# Patient Record
Sex: MALE | Race: BLACK OR AFRICAN AMERICAN | NOT HISPANIC OR LATINO | URBAN - METROPOLITAN AREA
[De-identification: names, ages, dates, MRNs, and addresses within clinical notes are randomized per-mention and may not be internally consistent; named-entity substitution may affect disease eponyms.]

---

## 2022-08-17 ENCOUNTER — TELEPHONE (OUTPATIENT)
Dept: TRANSPLANT | Facility: CLINIC | Age: 27
End: 2022-08-17
Payer: MEDICARE

## 2022-08-26 DIAGNOSIS — Z76.82 ORGAN TRANSPLANT CANDIDATE: Primary | ICD-10-CM

## 2022-08-29 ENCOUNTER — TELEPHONE (OUTPATIENT)
Dept: TRANSPLANT | Facility: CLINIC | Age: 27
End: 2022-08-29
Payer: MEDICARE

## 2022-08-29 NOTE — TELEPHONE ENCOUNTER
Contacted patient to review initial intake information. Patient reports the followin. Can you walk up a flight of stairs without getting short of breath or stopping? Yes   2. Can you walk one block without getting short of breath or having to stop? Yes   3. Do you use oxygen? No   4. Do you use a cane, walker, or wheel chair to assist in mobility? No   5. Have you been hospitalized or had recent surgery in the last 6 months? No    A. Stroke   B. Heart surgery or heart catheterization   C. Broken bone  6. Do you have any cuts, open sores (ulcers), or wounds anywhere on your body? Yes on his fistula  7. Do you go to dialysis? Yes What days? Daily  8. Preferred appointment day? Anyday  9. Caregiver? Not sure yet    Patient is aware the next steps will include completing records and compliance verification. Patient is aware once provider review and insurance authorization is received we will contact patient to schedule initial visit. Patient is aware that initial visit will begin prior to / at 7 am and will conclude at approximately 3 pm on date of appointment. All questions answered at this time.

## 2022-09-12 ENCOUNTER — TELEPHONE (OUTPATIENT)
Dept: TRANSPLANT | Facility: CLINIC | Age: 27
End: 2022-09-12
Payer: MEDICARE

## 2022-09-13 ENCOUNTER — TELEPHONE (OUTPATIENT)
Dept: TRANSPLANT | Facility: CLINIC | Age: 27
End: 2022-09-13
Payer: MEDICARE

## 2022-09-26 ENCOUNTER — TELEPHONE (OUTPATIENT)
Dept: TRANSPLANT | Facility: CLINIC | Age: 27
End: 2022-09-26
Payer: MEDICARE

## 2023-08-18 ENCOUNTER — TELEPHONE (OUTPATIENT)
Dept: TRANSPLANT | Facility: CLINIC | Age: 28
End: 2023-08-18
Payer: COMMERCIAL

## 2023-08-24 ENCOUNTER — TELEPHONE (OUTPATIENT)
Dept: TRANSPLANT | Facility: CLINIC | Age: 28
End: 2023-08-24
Payer: COMMERCIAL

## 2023-08-24 NOTE — TELEPHONE ENCOUNTER
Contacted patient to review initial intake information. Patient reports the followin. Can you walk up a flight of stairs without getting short of breath or stopping? yes  2. Can you walk one block without getting short of breath or having to stop? yes  3. Do you use oxygen? no  4. Do you use a cane, walker, or wheel chair to assist in mobility? no  5. Have you been hospitalized or had recent surgery in the last 6 months? no   A. Stroke    B. Heart surgery or heart catheterization   C. Broken bone  6. Do you have any cuts, open sores (ulcers), or wounds anywhere on your body?  No I  7. Do you go to dialysis? Yes What days? M,T,T,F,S  8. Preferred appointment day?   9. Caregiver? Brother (Elpidio) and Mother (Tony)    Patient is aware the next steps will include completing records and compliance verification. Patient is aware once provider review and insurance authorization is received we will contact patient to schedule initial visit. Patient is aware that initial visit will begin prior to / at 7 am and will conclude at approximately 3 pm on date of appointment. All questions answered at this time.

## 2023-08-31 ENCOUNTER — TELEPHONE (OUTPATIENT)
Dept: TRANSPLANT | Facility: CLINIC | Age: 28
End: 2023-08-31
Payer: COMMERCIAL

## 2023-09-01 ENCOUNTER — TELEPHONE (OUTPATIENT)
Dept: TRANSPLANT | Facility: CLINIC | Age: 28
End: 2023-09-01
Payer: MEDICARE

## 2023-09-06 DIAGNOSIS — Z76.82 ORGAN TRANSPLANT CANDIDATE: Primary | ICD-10-CM

## 2023-10-02 ENCOUNTER — TELEPHONE (OUTPATIENT)
Dept: TRANSPLANT | Facility: CLINIC | Age: 28
End: 2023-10-02
Payer: MEDICARE

## 2023-10-11 ENCOUNTER — HOSPITAL ENCOUNTER (OUTPATIENT)
Dept: RADIOLOGY | Facility: HOSPITAL | Age: 28
Discharge: HOME | End: 2023-10-11
Payer: MEDICARE

## 2023-10-11 ENCOUNTER — TELEPHONE (OUTPATIENT)
Dept: TRANSPLANT | Facility: CLINIC | Age: 28
End: 2023-10-11
Payer: MEDICARE

## 2023-10-11 ENCOUNTER — OFFICE VISIT (OUTPATIENT)
Dept: TRANSPLANT | Facility: CLINIC | Age: 28
End: 2023-10-11
Payer: COMMERCIAL

## 2023-10-11 ENCOUNTER — HOSPITAL ENCOUNTER (OUTPATIENT)
Dept: RADIOLOGY | Facility: HOSPITAL | Age: 28
Discharge: HOME | End: 2023-10-11
Attending: NURSE PRACTITIONER
Payer: MEDICARE

## 2023-10-11 VITALS
OXYGEN SATURATION: 99 % | BODY MASS INDEX: 38.04 KG/M2 | WEIGHT: 256.81 LBS | HEART RATE: 114 BPM | DIASTOLIC BLOOD PRESSURE: 74 MMHG | RESPIRATION RATE: 16 BRPM | SYSTOLIC BLOOD PRESSURE: 132 MMHG | TEMPERATURE: 97 F | HEIGHT: 69 IN

## 2023-10-11 DIAGNOSIS — N18.6 ESRD ON HEMODIALYSIS: ICD-10-CM

## 2023-10-11 DIAGNOSIS — Z71.85 IMMUNIZATION COUNSELING: ICD-10-CM

## 2023-10-11 DIAGNOSIS — N04.4: ICD-10-CM

## 2023-10-11 DIAGNOSIS — Z99.2 ANEMIA IN CHRONIC KIDNEY DISEASE, ON CHRONIC DIALYSIS: ICD-10-CM

## 2023-10-11 DIAGNOSIS — N25.81 SECONDARY HYPERPARATHYROIDISM OF RENAL ORIGIN: ICD-10-CM

## 2023-10-11 DIAGNOSIS — Z76.82 ORGAN TRANSPLANT CANDIDATE: ICD-10-CM

## 2023-10-11 DIAGNOSIS — D63.1 ANEMIA IN CHRONIC KIDNEY DISEASE, ON CHRONIC DIALYSIS: ICD-10-CM

## 2023-10-11 DIAGNOSIS — N18.6 ANEMIA IN CHRONIC KIDNEY DISEASE, ON CHRONIC DIALYSIS: ICD-10-CM

## 2023-10-11 DIAGNOSIS — I95.3 HEMODIALYSIS-ASSOCIATED HYPOTENSION: ICD-10-CM

## 2023-10-11 DIAGNOSIS — Z01.818 PRE-TRANSPLANT EVALUATION FOR CHRONIC KIDNEY DISEASE: Primary | ICD-10-CM

## 2023-10-11 DIAGNOSIS — B17.10 ACUTE HEPATITIS C VIRUS INFECTION WITHOUT HEPATIC COMA: ICD-10-CM

## 2023-10-11 DIAGNOSIS — E66.01 CLASS 2 SEVERE OBESITY DUE TO EXCESS CALORIES WITH SERIOUS COMORBIDITY AND BODY MASS INDEX (BMI) OF 38.0 TO 38.9 IN ADULT: ICD-10-CM

## 2023-10-11 DIAGNOSIS — R21 RASH: ICD-10-CM

## 2023-10-11 DIAGNOSIS — Z99.2 ESRD ON HEMODIALYSIS: ICD-10-CM

## 2023-10-11 DIAGNOSIS — E78.5 HYPERLIPIDEMIA, UNSPECIFIED HYPERLIPIDEMIA TYPE: ICD-10-CM

## 2023-10-11 PROBLEM — E66.812 CLASS 2 SEVERE OBESITY DUE TO EXCESS CALORIES WITH SERIOUS COMORBIDITY AND BODY MASS INDEX (BMI) OF 38.0 TO 38.9 IN ADULT: Status: ACTIVE | Noted: 2023-10-11

## 2023-10-11 PROBLEM — B19.20 UNSPECIFIED VIRAL HEPATITIS C WITHOUT HEPATIC COMA: Status: ACTIVE | Noted: 2022-05-31

## 2023-10-11 PROBLEM — N18.9 ANEMIA IN CHRONIC KIDNEY DISEASE: Status: ACTIVE | Noted: 2022-03-06

## 2023-10-11 PROCEDURE — 1160F RVW MEDS BY RX/DR IN RCRD: CPT | Mod: CPTII,S$GLB,TXP, | Performed by: NURSE PRACTITIONER

## 2023-10-11 PROCEDURE — 72170 X-RAY EXAM OF PELVIS: CPT | Mod: TC,TXP

## 2023-10-11 PROCEDURE — 99999 PR PBB SHADOW E&M-EST. PATIENT-LVL IV: CPT | Mod: PBBFAC,TXP,, | Performed by: NURSE PRACTITIONER

## 2023-10-11 PROCEDURE — 99205 OFFICE O/P NEW HI 60 MIN: CPT | Mod: S$GLB,TXP,, | Performed by: NURSE PRACTITIONER

## 2023-10-11 PROCEDURE — 3078F DIAST BP <80 MM HG: CPT | Mod: CPTII,S$GLB,TXP, | Performed by: NURSE PRACTITIONER

## 2023-10-11 PROCEDURE — 71046 X-RAY EXAM CHEST 2 VIEWS: CPT | Mod: TC,TXP

## 2023-10-11 PROCEDURE — 72170 X-RAY EXAM OF PELVIS: CPT | Mod: 26,TXP,, | Performed by: RADIOLOGY

## 2023-10-11 PROCEDURE — 76770 US EXAM ABDO BACK WALL COMP: CPT | Mod: TC,TXP

## 2023-10-11 PROCEDURE — 99999 PR PBB SHADOW E&M-EST. PATIENT-LVL IV: ICD-10-PCS | Mod: PBBFAC,TXP,, | Performed by: NURSE PRACTITIONER

## 2023-10-11 PROCEDURE — 99205 PR OFFICE/OUTPT VISIT, NEW, LEVL V, 60-74 MIN: ICD-10-PCS | Mod: S$GLB,TXP,, | Performed by: NURSE PRACTITIONER

## 2023-10-11 PROCEDURE — 3075F PR MOST RECENT SYSTOLIC BLOOD PRESS GE 130-139MM HG: ICD-10-PCS | Mod: CPTII,S$GLB,TXP, | Performed by: NURSE PRACTITIONER

## 2023-10-11 PROCEDURE — 1159F MED LIST DOCD IN RCRD: CPT | Mod: CPTII,S$GLB,TXP, | Performed by: NURSE PRACTITIONER

## 2023-10-11 PROCEDURE — 3008F BODY MASS INDEX DOCD: CPT | Mod: CPTII,S$GLB,TXP, | Performed by: NURSE PRACTITIONER

## 2023-10-11 PROCEDURE — 99203 OFFICE O/P NEW LOW 30 MIN: CPT | Mod: S$GLB,TXP,, | Performed by: STUDENT IN AN ORGANIZED HEALTH CARE EDUCATION/TRAINING PROGRAM

## 2023-10-11 PROCEDURE — 3075F SYST BP GE 130 - 139MM HG: CPT | Mod: CPTII,S$GLB,TXP, | Performed by: NURSE PRACTITIONER

## 2023-10-11 PROCEDURE — 1159F PR MEDICATION LIST DOCUMENTED IN MEDICAL RECORD: ICD-10-PCS | Mod: CPTII,S$GLB,TXP, | Performed by: NURSE PRACTITIONER

## 2023-10-11 PROCEDURE — 99203 PR OFFICE/OUTPT VISIT, NEW, LEVL III, 30-44 MIN: ICD-10-PCS | Mod: S$GLB,TXP,, | Performed by: STUDENT IN AN ORGANIZED HEALTH CARE EDUCATION/TRAINING PROGRAM

## 2023-10-11 PROCEDURE — 72170 XR PELVIS ROUTINE AP: ICD-10-PCS | Mod: 26,TXP,, | Performed by: RADIOLOGY

## 2023-10-11 PROCEDURE — 3078F PR MOST RECENT DIASTOLIC BLOOD PRESSURE < 80 MM HG: ICD-10-PCS | Mod: CPTII,S$GLB,TXP, | Performed by: NURSE PRACTITIONER

## 2023-10-11 PROCEDURE — 71046 XR CHEST PA AND LATERAL: ICD-10-PCS | Mod: 26,TXP,, | Performed by: RADIOLOGY

## 2023-10-11 PROCEDURE — 76770 US RETROPERITONEAL COMPLETE: ICD-10-PCS | Mod: 26,TXP,, | Performed by: INTERNAL MEDICINE

## 2023-10-11 PROCEDURE — 71046 X-RAY EXAM CHEST 2 VIEWS: CPT | Mod: 26,TXP,, | Performed by: RADIOLOGY

## 2023-10-11 PROCEDURE — 3008F PR BODY MASS INDEX (BMI) DOCUMENTED: ICD-10-PCS | Mod: CPTII,S$GLB,TXP, | Performed by: NURSE PRACTITIONER

## 2023-10-11 PROCEDURE — 76770 US EXAM ABDO BACK WALL COMP: CPT | Mod: 26,TXP,, | Performed by: INTERNAL MEDICINE

## 2023-10-11 PROCEDURE — 1160F PR REVIEW ALL MEDS BY PRESCRIBER/CLIN PHARMACIST DOCUMENTED: ICD-10-PCS | Mod: CPTII,S$GLB,TXP, | Performed by: NURSE PRACTITIONER

## 2023-10-11 RX ORDER — CALCITRIOL 0.5 UG/1
0.5 CAPSULE ORAL DAILY
COMMUNITY
Start: 2023-08-15

## 2023-10-11 RX ORDER — CINACALCET 90 MG/1
1 TABLET, FILM COATED ORAL DAILY
COMMUNITY
Start: 2023-08-09

## 2023-10-11 RX ORDER — SUCROFERRIC OXYHYDROXIDE 500 MG/1
2 TABLET, CHEWABLE ORAL
COMMUNITY
Start: 2023-07-12

## 2023-10-11 RX ORDER — FOLIC ACID/VIT B COMPLEX AND C 0.8 MG
1 TABLET ORAL DAILY
COMMUNITY
Start: 2023-07-05

## 2023-10-11 NOTE — PROGRESS NOTES
PHARM.D. PRE-TRANSPLANT NOTE:    This patient's medication therapy was evaluated as part of his pre-transplant evaluation.      The following general pharmacologic concerns were noted: none     The following concerns for post-operative pain management were noted: none    The following pharmacologic concerns related to HCV therapy were noted: none       This patient's medication profile was reviewed for considerations for DAA Hepatitis C therapy:    [X]  No current inducers of CYP 3A4 or PGP  [X]  No amiodarone on this patient's EMR profile in the last 24 months  [X]  No past or current atrial fibrillation on this patient's EMR profile       Current Outpatient Medications   Medication Sig Dispense Refill    B complex-vitamin C-folic acid (XENIA-RC) 0.8 mg Tab Take 1 tablet by mouth once daily.      calcitRIOL (ROCALTROL) 0.5 MCG Cap Take 0.5 mcg by mouth once daily.      cinacalcet (SENSIPAR) 90 MG Tab Take 1 tablet by mouth once daily.      sucroferric oxyhydroxide (VELPHORO) 500 mg Chew Take 2 tablets by mouth 3 (three) times daily with meals.       No current facility-administered medications for this visit.           I am available for consultation and can be contacted, as needed by the other members of the Transplant team.

## 2023-10-11 NOTE — LETTER
October 13, 2023        Ganesh Arambula  [address] [facility] [address]  Phone: [phone]  Fax: [phone]             [facility] 1st Fl  [address]  [facility] [address]  Phone: [phone]   Patient: Cholo Jackson   MR Number: [id number]   YOB: 1995   Date of Visit: 10/11/2023       Dear Dr. Ganesh Arambula    Thank you for referring Cholo Jackson to me for evaluation. Attached you will find relevant portions of my assessment and plan of care.    If you have questions, please do not hesitate to call me. I look forward to following Cholo Jackson along with you.    Sincerely,    Dariana Rosas, NP    Enclosure    If you would like to receive this communication electronically, please contact [email or web address] or [phone] to request [employer] access.    [employer] is a tool which provides read-only access to select patient information with whom you have a relationship. Its easy to use and provides real time access to review your patients record including encounter summaries, notes, results, and demographic information.    If you feel you have received this communication in error or would no longer like to receive these types of communications, please e-mail [email or web address]

## 2023-10-11 NOTE — PROGRESS NOTES
Transplant Surgery  Kidney Transplant Recipient Evaluation    Referring Physician: Ganesh Arambula  Current Nephrologist: Ganesh Arambula    Subjective:     Reason for Visit: evaluate transplant candidacy    History of Present Illness: Cholo Jackson is a 27 y.o. year old male undergoing transplant evaluation.    Dialysis History: Cholo is on hemodialysis.      Transplant History: N/A    Etiology of Renal Disease: Membranous Glomerulonephritis (based on medical records from referral).    External provider notes reviewed: Yes    Review of Systems  Objective:     Physical Exam:  Constitutional:   Vitals reviewed: yes   Well-nourished and well-groomed: yes  Eyes:   Sclerae icteric: no   Extraocular movements intact: yes  GI:    Bowel sounds normal: yes   Tenderness: no    If yes, quadrant/location: not applicable   Palpable masses: no    If yes, quadrant/location: not applicable   Hepatosplenomegaly: no   Ascites: no   Hernia: no    If yes, type/location: not applicable   Surgical scars: no    If yes, type/location: not applicable  Resp:   Effort normal: yes   Breath sounds normal: yes    CV:   Regular rate and rhythm: yes   Heart sounds normal: yes   Femoral pulses normal: yes   Extremities edematous: no  Skin:   Rashes or lesions present: no    If yes, describe:not applicable   Jaundice:: no    Musculoskeletal:   Gait normal: yes   Strength normal: yes  Psych:   Oriented to person, place, and time: yes   Affect and mood normal: yes    Additional comments:  obese abdomen    Diagnostics:  The following labs have been reviewed: CBC  CMP  INR  The following radiology images have been independently reviewed and interpreted: Pelvic Xray    Counseling: We provided Cholo Jackson with a group education session today.  We discussed kidney transplantation at length with him, including risks, potential complications, and alternatives in the management of his renal failure.  The discussion included complications related to anesthesia,  bleeding, infection, primary nonfunction, and ATN.  I discussed the typical postoperative course, length of hospitalization, the need for long-term immunosuppression, and the need for long-term routine follow-up.  I discussed living-donor and -donor transplantation and the relative advantages and disadvantages of each.  I also discussed average waiting times for both living donation and  donation.  I discussed national and center-specific survival rates.  I also mentioned the potential benefit of multicenter listing to candidates listed with centers within more than one organ procurement organization.  All questions were answered.    Patient advised that it is recommended that all transplant candidates, and their close contacts and household members receive Covid vaccination.    Final determination of transplant candidacy will be made once evaluation is complete and reviewed by the Kidney & Kidney/Pancreas Selection Committee.    Coronavirus disease (COVID-19) caused by severe acute respiratory virus coronavirus 2 (SARS-C0V 2) is associated with increased mortality in solid organ transplant recipients (SOT) compared to non-transplant patients. Vaccine responses to vaccination are depressed against SARS-CoV2 compared to normal individuals but improve with third vaccination doses. Vaccination prior to SOT provides both the best opportunity for transplant candidates to develop protective immunity and to reduce the risk of serious COVID19 infections post transplantation. Organ transplant candidates at [facility] will be required to receive SARS-CoV-2 vaccination prior to being listed with a an active status, whenever possible. Exceptions will be made for disability related reasons or for sincerely held Taoist beliefs.          Transplant Surgery - Candidacy   Assessment/Plan:   Cholo Jackson has end stage renal disease (ESRD) on dialysis. I see no surgical  contraindication to placing a kidney transplant. Based on available information, Cholo Jackson is a suitable kidney transplant candidate.     Additional testing to be completed according to the Written Order Guidelines for Adult Pre-kidney and Pancreas Transplant Evaluation (KI-02).  Interpretation of tests and discussion of patient management involves all members of the multidisciplinary transplant team.    Luiz Zimmerman Jr, MD

## 2023-10-11 NOTE — PROGRESS NOTES
PRE-TRANSPLANT INFECTIOUS DISEASE CONSULT    Reason for Visit:  Pre-transplant evaluation  Referring Provider: Dr. Ganesh Arambula     History of Present Illness:    27 y.o. male with a history of  HTN related nephrotic syndrome and ESRD on HD -- presents for pre-kidney transplant evaluation.    Infectious History:  Recent hospital admissions: No  Recent infections: No  Recent or current antibiotic use: No  History of recurrent infections *(sinus / pneumonia / UTI / SBP)*: No  History of diabetic foot wound or bone/joint infection: No  Recent dental infections, issues or procedures: No  History of chicken pox: No  History of shingles: No  History of STI: No  History of COVID infection: Yes    History of Immunosuppression:  Prior chemotherapy / immunosuppression: No  Prior transplant: No  History of splenectomy: No    Tuberculosis:  Prior screening for latent TB: Yes  Prior diagnosis of latent TB: No  Risk factors for TB *known exposure, incarceration, homelessness*: No    Geographical exposures:  Currently lives in [place] with mother.  Lived in the following states: [place], [place]  Lived or travelled to the [facility] [place]: No  International travel: No  Travel-associated illness: No    Social/Environmental:  Occupation:  none  Pets: No   Livestock: No  Fishing / hunting: No  Hobbies: relaxing, movies, video games  Water: [place]  Consumption of raw/undercooked meat or seafood?  No  Tobacco: No  Alcohol: No  Recreational drug use:  No  Sexual partners: currently, girlfriend; historically female partners only.      Past Histories:   Past Medical History:   Diagnosis Date    Alteration in nutrition     Anemia     Disorder of kidney and ureter     Eczema     Hyperlipidemia     Hypertension     Membranous glomerulonephritis     Obesity      Past Surgical History:   Procedure Laterality Date    ADENOIDECTOMY      AV FISTULA PLACEMENT Left     Upper Arm    RENAL BIOPSY      TONSILLECTOMY      TUNNELED VENOUS CATHETER PLACEMENT    "    Family History   Problem Relation Age of Onset    Diabetes Maternal Aunt     Kidney disease Maternal Grandmother      Social History     Tobacco Use    Smoking status: Never    Smokeless tobacco: Never   Substance Use Topics    Alcohol use: Not Currently    Drug use: Never     Review of patient's allergies indicates:   Allergen Reactions    Shrimp          Immunization History:  Received all childhood vaccines: Yes  All household members receive annual flu vaccine: No  All household members are up to date on COVID vaccine: No      Current antibiotics:  Antibiotics (From admission, onward)      None              Review of Systems  Review of Systems   Constitutional: Negative.   All other systems reviewed and are negative.         Objective  Physical Exam  Vitals reviewed.   Constitutional:       General: He is not in acute distress.     Appearance: Normal appearance. He is normal weight. He is not ill-appearing, toxic-appearing or diaphoretic.   HENT:      Nose: No congestion.   Eyes:      General: No scleral icterus.     Conjunctiva/sclera: Conjunctivae normal.   Cardiovascular:      Rate and Rhythm: Normal rate.   Pulmonary:      Effort: No respiratory distress.   Skin:     General: Skin is warm and dry.      Findings: No rash.   Neurological:      Mental Status: He is alert and oriented to person, place, and time. Mental status is at baseline.   Psychiatric:         Behavior: Behavior normal.         Thought Content: Thought content normal.           Labs:    CBC:   Lab Results   Component Value Date    WBC 6.87 10/11/2023    HGB 11.0 (L) 10/11/2023    HCT 34.8 (L) 10/11/2023    MCV 91 10/11/2023     (L) 10/11/2023    GRAN 4.3 10/11/2023    GRAN 63.1 10/11/2023    LYMPH 1.5 10/11/2023    LYMPH 21.5 10/11/2023    MONO 0.5 10/11/2023    MONO 7.7 10/11/2023    EOSINOPHIL 7.0 10/11/2023       Syphilis screening: No results found for: "RPR", "PRPQ", "FTAABS"     TB screening: No results found for: " ""TBGOLDPLUS", "TSPOTSCREN"    HIV screening:   Lab Results   Component Value Date    FUW92FBSK Non-reactive 10/11/2023       Strongyloides IgG: No results found for: "STRONGANTIGG"    Hepatitis Serologies:   Lab Results   Component Value Date    HEPAIGG Non-reactive 10/11/2023    HEPBCAB Non-reactive 10/11/2023    HEPBSAB 182.05 10/11/2023    HEPBSAB Reactive 10/11/2023    HEPCAB Non-reactive 10/11/2023        Varicella IgG:   Lab Results   Component Value Date    VARICELLAINT Positive 10/11/2023         Immunization History   Administered Date(s) Administered    COVID-19, MRNA, LN-S, PF (MODERNA FULL 0.5 ML DOSE) 02/22/2022, 03/25/2022    COVID-19, mRNA, LNP-S, bivalent booster, PF (PFIZER OMICRON) 02/09/2023    Dtap, Unspecified Formulation 05/09/2001    HIB 05/30/1997    Hepatitis A 08/15/2012    Hepatitis B 1996, 05/30/1997    Hepatitis B (recombinant) Adjuvanted, 2 dose 01/19/2022, 02/21/2022, 03/21/2022, 05/16/2022    Hepatitis B, Pediatric/Adolescent 1996    IPV 05/09/2001    Influenza (FLUBLOK) - Quadrivalent - Recombinant - PF *Preferred* (egg allergy) 10/10/2023    Influenza - Quadrivalent - PF *Preferred* (6 months and older) 09/22/2022    MMR 10/31/1997, 05/09/2001    Meningococcal Conjugate (MCV4P) 08/15/2012    OPV 1995, 03/26/1997, 12/18/1997    Pneumococcal Conjugate - 13 Valent 02/23/2022    Pneumococcal Polysaccharide - 23 Valent 04/18/2022    Tdap 08/15/2012    Varicella 08/16/2000, 08/15/2012          Assessment and Plan    1. Risks of Infection: Available serologies were reviewed. No unusual risks of infection or significant barriers to transplantation were identified from the infectious disease standpoint given the information available at this time.    - Acute infectious issues: None   - Pending serologies: Quantiferon gold / T-spot, RPR, and Strongyloides IgG   - Please call if any pending serologic testing is positive.    2. Immunizations:  Based on the patient's " immunization history and serologies, the following immunizations are recommended:  - Hepatitis A    Patient does not have immunity to hepatitis A    Vaccination ordered today: Yes   - Hepatitis B    Patient does have immunity to hepatitis B    Vaccination ordered today: No. Reason for not ordering: Immunity   - COVID    Current CDC vaccination recommendations were discussed with the patient   - Annual high dose influenza     Vaccination ordered today: No. Reason for not ordering: vaccination up to date   - Prevnar 20    Vaccination ordered today: No. Reason for not ordering: vaccination up to date   - Tdap    Vaccination ordered today: Yes   - Shingrix    Vaccination ordered today: Yes    Recommended Pre-Transplant Immunization Schedule   Vaccine  0m 1m 2m 6m   Pneumococcal conjugate vaccine (Prevnar 20) X      Tetanus-diphtheria-pertussis (Tdap)* X      Hepatitis A Vaccine (Havrix)** X   X   Hepatitis B Vaccine (Heplisav)** X X     Influenza (annual) X      Zoster Recombinant Vaccine (Shingrix) X  X           *Administer booster every 10 years.       **Administer if no immunity demonstrated on serologies               Patient will receive vaccines at local pharmacy. A written prescription was provided for all vaccine doses.     3. Counseling:   I discussed with the patient the risk for increased susceptibility to infections following transplantation including increased risk for infection right after transplant and if rejection should occur.  The patient has been counseled on the importance of vaccinations to decrease risk of infection and severe illness. Specific guidance has been provided to the patient regarding the patient's occupation, hobbies and activities to avoid future infectious complications.     4. Transplant Candidacy: Based on available information, there are no identified significant barriers to transplantation from an infectious disease standpoint.  Final determination of transplant candidacy will  be made once evaluation is complete and reviewed by the Selection Committee.      Follow up with infectious disease as needed.       The total time for evaluation and management services performed on 10/11/2023 was greater than 30 minutes.

## 2023-10-11 NOTE — PROGRESS NOTES
INITIAL PATIENT EDUCATION NOTE    Mr. Cholo Jackson was seen in pre-kidney transplant clinic for evaluation for kidney, kidney/pancreas or pancreas only transplant.  The patient attended a an individual video education session that discussed/reviewed the following aspects of transplantation: evaluation including diagnostic and laboratory testing,( Chemistries, Hematology, Serologies including HIV and Hepatitis and HLA) required for transplantation and selection committee process, UNOS waitlist management/multiple listings, types of organs offered (KDPI < 85%, KDPI > 85%, PHS risk, DCD, HCV+, HIV+ for HIV+ recipients and enbloc/dual), financial aspects, surgical procedures, dietary instruction pre- and post-transplant, health maintenance pre- and post-transplant, post-transplant hospitalization and outpatient follow-up, potential to participate in a research protocol, and medication management and side effects.  A question and answer session was provided after the presentation.    The patient was seen by all members of the multi-disciplinary team to include: Nephrologist/KAJAL, Surgeon, , Transplant Coordinator, , Pharmacist and Dietician (if applicable).    The patient reviewed and signed all consents for evaluation which were witnessed and sent to scanning into the [facility] chart.    The patient was given an education book and plan for further evaluation based on his individual assessment.      Reviewed program requirement for complete COVID vaccination with documentation prior to listing.  COVID education information reviewed with patient. Patient encouraged to be up to date on all vaccinations.       The patient was informed that the transplant team would manage immediate post op pain. If the patient requires long term pain management, they will need to have that pain management addressed by their PCP or previous provider who wrote for long term pain medicines.    The patient was  encouraged to call with any questions or concerns.

## 2023-10-11 NOTE — PROGRESS NOTES
Transplant Nephrology  Kidney Transplant Recipient Evaluation    Referring Physician: Ganesh Arambula  Current Nephrologist: Ganesh Arambula    Subjective:   CC:  Initial evaluation of kidney transplant candidacy.    HPI:  Mr. Jackson is a 27 y.o. year old Black or  male who has presented to be evaluated as a potential kidney transplant recipient.  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  Patient is currently on hemodialysis started on 12/18/21. Patient is dialyzing on TTS schedule.  Patient reports that he is tolerating dialysis well.. He has a LUE AV graft for dialysis access. He is dialyzing for 3 hours per session.     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 Reports not feeling well and was admitted  to the hospital d/t kidney fx at 10%    Deaf left ear -- since birth     Donors-no, discussed and encouraged living donation     2021 Kidney bx-at [place]     Fx assessment:   Over all looks good ot frail, does not exercise, likes to watch movies and play video games    Body mass index is 38.08 kg/m².  Encourage/discussed  lifestyle modifications: diet, exercise, weight loss   Needs to maintain acceptable BMI for txp/guidelines     HX + HCV (retrieved from outside problem list)    Pt denies knowledge of this diagnosis --Hep panel today--results pending     Eczema--rash to upper arms and chest--has not followed up with dermatology for tx        HX Hemodialysis -ass hypotension--Pt reports he is no longer having BP issues, this was a problem when he first started HD     (CE)      BP Readings from Last 3 Encounters:   10/11/23 132/74       Past Medical History:   Diagnosis Date    Alteration in nutrition     Anemia     Disorder of kidney and ureter     Eczema     Hyperlipidemia     Hypertension     Membranous  glomerulonephritis     Obesity        Past Medical and Surgical History: Mr. Jackson  has a past medical history of Alteration in nutrition, Anemia, Disorder of kidney and ureter, Eczema, Hyperlipidemia, Hypertension, Membranous glomerulonephritis, and Obesity.  He has a past surgical history that includes AV fistula placement (Left); Renal biopsy; Tunneled venous catheter placement; Adenoidectomy; and Tonsillectomy.    Past Social and Family History: Mr. Jackson reports that he has never smoked. He has never used smokeless tobacco. He reports that he does not currently use alcohol. He reports that he does not use drugs. His family history includes Diabetes in his maternal aunt; Kidney disease in his maternal grandmother.    Review of Systems   Constitutional:  Positive for chills, fatigue and fever. Negative for activity change, appetite change and unexpected weight change.   HENT:  Positive for hearing loss (deaf  left ear). Negative for congestion, facial swelling, postnasal drip, rhinorrhea, sinus pressure, sore throat and trouble swallowing.    Eyes:  Positive for visual disturbance (wears glasses).   Respiratory:  Negative for cough, chest tightness, shortness of breath and wheezing.    Cardiovascular: Negative.  Negative for chest pain, palpitations and leg swelling.   Gastrointestinal:  Positive for abdominal distention. Negative for abdominal pain, diarrhea, nausea and vomiting.   Genitourinary:  Positive for decreased urine volume (anuric). Negative for dysuria, flank pain and urgency.   Musculoskeletal:  Negative for gait problem, neck pain and neck stiffness.   Skin:  Positive for rash.        Hx eczema --itchy, located to arms and chest    Allergic/Immunologic: Positive for food allergies.   Neurological:  Negative for dizziness, weakness, light-headedness and headaches.   Psychiatric/Behavioral:  Negative for agitation and confusion. The patient is not nervous/anxious.        Objective:   Blood  "pressure 132/74, pulse (!) 114, temperature 97.2 °F (36.2 °C), temperature source Temporal, resp. rate 16, height 5' 8.86" (1.749 m), weight 116.5 kg (256 lb 13.4 oz), SpO2 99 %.body mass index is 38.08 kg/m².    Physical Exam  Vitals reviewed.   Constitutional:       Appearance: Normal appearance. He is well-developed. He is obese.   HENT:      Head: Normocephalic.   Eyes:      Pupils: Pupils are equal, round, and reactive to light.   Cardiovascular:      Rate and Rhythm: Normal rate and regular rhythm.      Heart sounds: Normal heart sounds.   Pulmonary:      Effort: Pulmonary effort is normal.      Breath sounds: Normal breath sounds.   Abdominal:      General: Bowel sounds are normal.      Palpations: Abdomen is soft.   Musculoskeletal:         General: Normal range of motion.        Arms:       Cervical back: Normal range of motion and neck supple.   Skin:     General: Skin is warm and dry.      Findings: Erythema and rash present. Rash is papular.          Neurological:      Mental Status: He is alert and oriented to person, place, and time.      Motor: No abnormal muscle tone.   Psychiatric:         Behavior: Behavior normal.         Labs:  Lab Results   Component Value Date    WBC 6.87 10/11/2023    HGB 11.0 (L) 10/11/2023    HCT 34.8 (L) 10/11/2023     10/11/2023    K 3.5 10/11/2023     10/11/2023    CO2 25 10/11/2023    BUN 46 (H) 10/11/2023    CREATININE 18.0 (H) 10/11/2023    EGFRNORACEVR 3.3 (A) 10/11/2023    CALCIUM 8.9 10/11/2023    PHOS 8.4 (H) 10/11/2023    ALBUMIN 4.0 10/11/2023    AST 25 10/11/2023    ALT 24 10/11/2023    .8 (H) 10/11/2023       No results found for: "PREALBUMIN", "BILIRUBINUA", "GGT", "AMYLASE", "LIPASE", "PROTEINUA", "NITRITE", "RBCUA", "WBCUA"    No results found for: "HLAABCTYPE"    Labs were reviewed with the patient.    Assessment:     1. Pre-transplant evaluation for chronic kidney disease    2. ESRD on hemodialysis    3. Hemodialysis-associated " hypotension    4. Acute hepatitis C virus infection without hepatic coma    5. Secondary hyperparathyroidism of renal origin    6. Anemia in chronic kidney disease, on chronic dialysis    7. 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 Hyperlipidemia, unspecified hyperlipidemia type    9. Class 2 severe obesity due to excess calories with serious comorbidity and body mass index (BMI) of 38.0 to 38.9 in adult    10. Immunization counseling    11. Rash        Plan:   12/22/21 Kidney bx--get path reports, at [facility] [place]   ? Hx + HCV--Add Abd US complete, Hep panel pending, If + HCV, will need hepatology clearance and liver staging   Eczema/rash--recommended dermatology f/u for evaluation and tx--not contingent on presentation   Body mass index is 38.08 kg/m².  Encourage lifestyle modifications: diet, exercise, weight loss   Needs to maintain acceptable BMI for txp/guidelines     Transplant Candidacy:   Based on available information, Mr. Jackson is a suitable kidney transplant candidate.   Meets center eligibility for accepting HCV+ donor offer - Yes.  Patient educated on HCV+ donors. Riruizcarmen is willing to accept HCV+ donor offer - Yes   Patient is a candidate for KDPI > 85 kidney donor offer - No d/t age and weight  Final determination of transplant candidacy will be made once workup is complete and reviewed by the selection committee.    Patient advised that it is recommended that all transplant candidates, and their close contacts and household members receive Covid vaccination.    UNOS Patient Status  Functional Status: 60% - Requires occasional assistance but is able to care for needs    Diabetes: No    Dariana Rosas NP

## 2023-10-11 NOTE — TELEPHONE ENCOUNTER
Reviewed pt transplant labs.  Notified dialysis unit dietitian of the following abnormal labs via fax and requested their most recent nutrition note on this pt.  Once this note is received it will be scanned into pt's chart.     Triglycerides 221  Phos 8.4

## 2023-10-12 NOTE — PROGRESS NOTES
Transplant Recipient Adult Psychosocial Assessment    Cholo Jackson  [address]  [place] [address]  Telephone Information:   [place] [phone]   Home  [phone] (home)  Work  There is no work phone number on file.  E-mail  [email or web address]    Sex: male  YOB: 1995  Age: 27 y.o.    Encounter Date: 10/11/2023  [place] Citizen: yes  Primary Language: English   Needed: no  Transportation: pt reports mother does NOT drive. Pt reports brother Elpidio assists with transportation but patient reports does need to utilize cab service sometimes. Pt reports understanding if mother is caregiver transportation will need to be arranged and paid for by patient -- [facility] does not arrange or pay for transportation.    Emergency Contact:  Jean Hatch, 47 yo mother, does NOT drive, [place], not working. Pt reports understanding if mother is caregiver transportation will need to be arranged and paid for by patient -- [facility] does not arrange or pay for transportation. [phone]    Family/Social Support:   Number of dependents/: pt denies  Marital history: single  Other family dynamics: Pt reports mother Jean Hatch is living well. Pt reports lives with mother Juvenal Hatch (does not drive) and brother Elpidio Hare (does drive) in [place]. Pt reports mother Juvenal and brother Elpidio will be assisting with transplant.    Household Composition:  Juvenal Hatch, 47 yo mother, does NOT drive, [place], not working. Pt reports understanding if mother is caregiver transportation will need to be arranged and paid for by patient -- [facility] does not arrange or pay for transportation.  Elpidio Hare, 26 yo brother, does drive/own car, [place], works full time at [email or web address]. [phone]    Do you and your caregivers have access to reliable transportation? yes Pt reports understanding if mother is caregiver transportation will need to be arranged and paid for by patient -- [facility] does not  arrange or pay for transportation.    PRIMARY CAREGIVER:  Juvenal Hatch, mother, [phone] and Elpidio Hare, brother, [phone], will be primary caregivers.     provided in-depth information to patient and caregiver regarding pre- and post-transplant caregiver role.   strongly encourages patient and caregiver to have concrete plan regarding post-transplant care giving, including back-up caregiver(s) to ensure care giving needs are met as needed.    Patient and Caregiver states understanding all aspects of caregiver role/commitment and is able/willing/committed to being caregiver to the fullest extent necessary.    Patient and Caregiver verbalizes understanding of the education provided today and caregiver responsibilities.         remains available. Patient and Caregiver agree to contact  in a timely manner if concerns arise.      Able to take time off work without financial concerns: yes.     Additional Significant Others who will Assist with Transplant:  Elpidio Hare, 26 yo brother, does drive/own car, [place], works full time at [employer]. [phone]    Living Will: no  Healthcare Power of : no  Advance Directives on file: <<no information> per medical record.  Verbally reviewed LW/HCPA information.   provided patient with copy of LW/HCPA documents and provided education on completion of forms.    Living Donors: Education and resource information given to patient.    Highest Education Level: High School (9-12) or GED  Reading Ability: 12th grade  Reports difficulty with: N/A  Learns Best By:  multisensory     Status: no  VA Benefits: no     Working for Income: No  If no, reason not working: Disability  Patient reports is disabled/not working. Pt reports last job held was [employer] full time  1.5 years.    Spouse/Significant Other Employment: Pt reports is not .    Disabled: disabled due to  ESRD, 2022    Monthly Income:  $1,376 disability check  Able to afford all costs now and if transplanted, including medications: yes  Patient and Caregiver verbalizes understanding of personal responsibilities related to transplant costs and the importance of having a financial plan to ensure that patients transplant costs are fully covered.       provided fundraising information/education. Patient and Caregiververbalizes understanding.   remains available.    Insurance:   Payer/Plan Subscr  Sex Relation Sub. Ins. ID Effective Group Num   1. BLUE CROSS BL* JUSTINA LUNA 1995 Male Self [id number]                                   [address] [id number]     Primary Insurance (for UNOS reporting): Public Insurance - Medicare & Choice Pt reports plan to ask insurance of any transplant benefits for travel, meals and lodging. Pt reports utilizes [facility] for medications.  Secondary Insurance (for UNOS reporting): None  Patient and Caregiver verbalizes clear understanding that patient may experience difficulty obtaining and/or be denied insurance coverage post-surgery. This includes and is not limited to disability insurance, life insurance, health insurance, burial insurance, long term care insurance, and other insurances.      Patient and Caregiver also reports understanding that future health concerns related to or unrelated to transplantation may not be covered by patient's insurance.  Resources and information provided and reviewed.     Patient and Caregiver provides verbal permission to release any necessary information to outside resources for patient care and discharge planning.  Resources and information provided are reviewed.      [facility], [phone].  Home Hemo NexStage 5 days week for 3 hours.    Dialysis Adherence: Patient and Caregiver reports high dialysis compliance with treatments and instructions within last 3 months.   Dialysis compliance update requested.    Infusion Service: patient utilizing? yes heprin and iron done at home  Home Health: patient utilizing? no  DME: yes NexStage equipment and supplies  Pulmonary/Cardiac Rehab: pt denies  ADLS:  independent     Adherence:   Pt reports high medical compliance with appointments and instructions within last 3 months.  Adherence education and counseling provided.     Per History Section:  Past Medical History:   Diagnosis Date    Alteration in nutrition     Anemia     Disorder of kidney and ureter     Eczema     Hyperlipidemia     Hypertension     Membranous glomerulonephritis     Obesity      Social History     Tobacco Use    Smoking status: Never    Smokeless tobacco: Never   Substance Use Topics    Alcohol use: Not Currently     Social History     Substance and Sexual Activity   Drug Use Never     Social History     Substance and Sexual Activity   Sexual Activity Not Currently       Per Today's Psychosocial:  Tobacco: none, patient denies any use.  Alcohol: none, patient denies any use.  Illicit Drugs/Non-prescribed Medications: none, patient denies any use.    Patient and Caregiver states clear understanding of the potential impact of substance use as it relates to transplant candidacy and is aware of possible random substance screening.  Substance abstinence/cessation counseling, education and resources provided and reviewed.     Arrests/DWI/Treatment/Rehab: patient denies    Psychiatric History:    Mental Health: pt denies any mental health history or any current mental health concerns. Pt denies any need for mental health referral at this time.  Psychiatrist/Counselor: pt denies  Medications:  pt denies  Suicide/Homicide Issues: pt denies any si/hi history  Safety at home: pt reports living in safe home environment with no abuse    Knowledge: Patient and Caregiver states having clear understanding and realistic expectations regarding the potential risks and potential benefits  of organ transplantation and organ donation and agrees to discuss with health care team members and support system members, as well as to utilize available resources and express questions and/or concerns in order to further facilitate the pt informed decision-making.  Resources and information provided and reviewed.    Patient and Caregiver is aware of [facility]'s affiliation and/or partnership with agencies in home health care, LTAC, SNF, [facility], and other hospitals and clinics.    Understanding: Patient and Caregiver reports having a clear understanding of the many lifetime commitments involved with being a transplant recipient, including costs, compliance, medications, lab work, procedures, appointments, concrete and financial planning, preparedness, timely and appropriate communication of concerns, abstinence (ETOH, tobacco, illicit non-prescribed drugs), adherence to all health care team recommendations, support system and caregiver involvement, appropriate and timely resource utilization and follow-through, mental health counseling as needed/recommended, and patient and caregiver responsibilities.  Social Service Handbook, resources and detailed educational information provided and reviewed.  Educational information provided.    Patient and Caregiver also reports current and expected compliance with health care regime and states having a clear understanding of the importance of compliance.      Patient and Caregiver reports a clear understanding that risks and benefits may be involved with organ transplantation and with organ donation.       Patient and Caregiver also reports clear understanding that psychosocial risk factors may affect patient, and include but are not limited to feelings of depression, generalized anxiety, anxiety regarding dependence on others, post traumatic stress disorder, feelings of guilt and other emotional and/or mental concerns, and/or exacerbation of existing mental health concerns.   Detailed resources provided and discussed.      Patient and Caregiver agrees to access appropriate resources in a timely manner as needed and/or as recommended, and to communicate concerns appropriately.  Patient and Caregiver also reports a clear understanding of treatment options available.     Patient and Caregiver received education in a group setting.   reviewed education, provided additional information, and answered questions.    Feelings or Concerns: Pt reports high motivation to pursue kidney organ transplant at this time.    Coping: Identify Patient & Caregiver Strategies to [place]:   1. In the past, coping with major surgery and/or related stress - family support; enjoys playing video games; enjoys walking for exercise.   2. Currently & Pre-transplant - family support; enjoys playing video games; enjoys walking for exercise.   3. At the time of surgery - family support   4. During post-Transplant & Recovery Period - family support    Goals: Pt reports hope for successful kidney organ transplant so he may discontinue dialysis and have healthier life.  Patient referred to Vocational Rehabilitation.    Interview Behavior: Patient and Caregiver presents as alert and oriented x 4, pleasant, good eye contact, well groomed, recall good, concentration/judgement good, average intelligence, calm, communicative, cooperative, and asking and answering questions appropriately. Pt's supportive mother Juvenal in session with patient's permission.         Transplant Social Work - Candidacy  Assessment/Plan:     Psychosocial Suitability: Patient presents as low risk candidate for kidney transplant at this time. Pt reports having organ transplant caregiver/transportation plan, medical insurance plan and plan to afford transplant costs all in place.    Recommendations/Additional Comments: Dialysis compliance update requested. Pt reports plan to contact medical insurance regarding any transplant benefits for  travel, meals and lodging. pt reports mother does NOT drive. Pt reports brother Elpidio assists with transportation but patient reports does need to utilize cab service sometimes. Pt reports understanding if mother is caregiver transportation will need to be arranged and paid for by patient -- [facility] does not arrange or pay for transportation.    SW recommends that pt conduct fundraising to assist pt with pay for cost of medications, food, gas, and other transplant related needs.  SW recommends that pt remain aware of potential mental health concerns and contact the team if any concerns arise.  SW recommends that pt remain abstinent from tobacco, ETOH, and drug use.  SW supports pt's continued adherence. SW remains available to answer any questions or concerns that arise as the pt moves through the transplant process.      Final determination of transplant candidacy will be reviewed by the selection committee.        Antonietta OBANDO LCSW

## 2023-10-13 ENCOUNTER — DOCUMENTATION ONLY (OUTPATIENT)
Dept: TRANSPLANT | Facility: CLINIC | Age: 28
End: 2023-10-13
Payer: MEDICARE

## 2023-10-24 ENCOUNTER — TELEPHONE (OUTPATIENT)
Dept: TRANSPLANT | Facility: CLINIC | Age: 28
End: 2023-10-24
Payer: MEDICARE

## 2023-10-24 NOTE — TELEPHONE ENCOUNTER
Returned call, spoke with Daunta, informed patient does not need a GYN exam due to male gender and two ABO's are required, with one collected on 10/11/23. Danuta verbalized understanding and stated she will communicate with the .         ----- Message from Jud Lopez RN sent at 10/24/2023  1:53 PM CDT -----  Regarding: FW: letters recieved RE:?gyn appt & blood type order  Contact: JACKIE   Natacha can you please help with this?  Jud  ----- Message -----  From: Jana Castanon  Sent: 10/24/2023  12:51 PM CDT  To: [facility] Pre-Kidney Transplant Clinical  Subject: letters recieved RE:?gyn appt & blood type o#    Pt dialysis  called regarding a letter they recieved about patient needing a GYN appt (male)  Also recieved letter about blood type - shows completed 10/11/23    Call back #  [phone]

## 2023-10-27 ENCOUNTER — TELEPHONE (OUTPATIENT)
Dept: TRANSPLANT | Facility: CLINIC | Age: 28
End: 2023-10-27
Payer: MEDICARE

## 2023-10-27 NOTE — TELEPHONE ENCOUNTER
----- Message from Luiz Zimmerman Jr., MD sent at 10/24/2023  9:18 AM CDT -----  Lookks acceptable  ----- Message -----  From: Abadie, Michelle, RN  Sent: 10/23/2023   9:30 PM CDT  To: Luiz Zimmerman Jr., MD    RR patient, not routed to you.

## 2023-11-03 ENCOUNTER — APPOINTMENT (RX ONLY)
Dept: URBAN - METROPOLITAN AREA CLINIC 158 | Facility: CLINIC | Age: 28
Setting detail: DERMATOLOGY
End: 2023-11-03

## 2023-11-03 VITALS — WEIGHT: 252 LBS | HEIGHT: 68 IN

## 2023-11-03 DIAGNOSIS — L28.1 PRURIGO NODULARIS: ICD-10-CM | Status: INADEQUATELY CONTROLLED

## 2023-11-03 DIAGNOSIS — L20.89 OTHER ATOPIC DERMATITIS: ICD-10-CM | Status: INADEQUATELY CONTROLLED

## 2023-11-03 PROCEDURE — ? PRESCRIPTION

## 2023-11-03 PROCEDURE — 99204 OFFICE O/P NEW MOD 45 MIN: CPT

## 2023-11-03 PROCEDURE — ? COUNSELING

## 2023-11-03 PROCEDURE — ? TREATMENT REGIMEN

## 2023-11-03 RX ORDER — BETAMETHASONE DIPROPIONATE 0.5 MG/G
APPLY CREAM, AUGMENTED TOPICAL BID
Qty: 50 | Refills: 1 | Status: ERX | COMMUNITY
Start: 2023-11-03

## 2023-11-03 RX ORDER — TRIAMCINOLONE ACETONIDE 1 MG/G
APPLY CREAM TOPICAL BID
Qty: 454 | Refills: 1 | Status: ERX | COMMUNITY
Start: 2023-11-03

## 2023-11-03 RX ORDER — TACROLIMUS 1 MG/G
APPLY OINTMENT TOPICAL BID
Qty: 60 | Refills: 1 | Status: ERX | COMMUNITY
Start: 2023-11-03

## 2023-11-03 RX ORDER — FEXOFENADINE HYDROCHLORIDE 180 MG/1
1 TABLET ORAL BID
Qty: 60 | Refills: 1 | Status: ERX | COMMUNITY
Start: 2023-11-03

## 2023-11-03 RX ADMIN — FEXOFENADINE HYDROCHLORIDE 1: 180 TABLET ORAL at 00:00

## 2023-11-03 RX ADMIN — BETAMETHASONE DIPROPIONATE APPLY: 0.5 CREAM, AUGMENTED TOPICAL at 00:00

## 2023-11-03 RX ADMIN — TRIAMCINOLONE ACETONIDE APPLY: 1 CREAM TOPICAL at 00:00

## 2023-11-03 RX ADMIN — TACROLIMUS APPLY: 1 OINTMENT TOPICAL at 00:00

## 2023-11-03 ASSESSMENT — LOCATION SIMPLE DESCRIPTION DERM
LOCATION SIMPLE: LEFT UPPER BACK
LOCATION SIMPLE: LEFT UPPER ARM
LOCATION SIMPLE: LEFT FOREARM
LOCATION SIMPLE: UPPER BACK
LOCATION SIMPLE: LOWER BACK
LOCATION SIMPLE: LEFT LOWER BACK
LOCATION SIMPLE: RIGHT UPPER ARM
LOCATION SIMPLE: ABDOMEN
LOCATION SIMPLE: RIGHT FOREARM

## 2023-11-03 ASSESSMENT — ITCH NUMERIC RATING SCALE: HOW SEVERE IS YOUR ITCHING?: 10

## 2023-11-03 ASSESSMENT — LOCATION DETAILED DESCRIPTION DERM
LOCATION DETAILED: RIGHT VENTRAL PROXIMAL FOREARM
LOCATION DETAILED: LEFT VENTRAL PROXIMAL FOREARM
LOCATION DETAILED: LEFT INFERIOR MEDIAL MIDBACK
LOCATION DETAILED: PERIUMBILICAL SKIN
LOCATION DETAILED: INFERIOR THORACIC SPINE
LOCATION DETAILED: LEFT ANTERIOR DISTAL UPPER ARM
LOCATION DETAILED: SUPERIOR LUMBAR SPINE
LOCATION DETAILED: LEFT SUPERIOR UPPER BACK
LOCATION DETAILED: RIGHT ANTERIOR DISTAL UPPER ARM
LOCATION DETAILED: EPIGASTRIC SKIN

## 2023-11-03 ASSESSMENT — SEVERITY ASSESSMENT 2020: SEVERITY 2020: MODERATE

## 2023-11-03 ASSESSMENT — LOCATION ZONE DERM
LOCATION ZONE: TRUNK
LOCATION ZONE: ARM

## 2023-11-03 ASSESSMENT — BSA ECZEMA: % BODY COVERED IN ECZEMA: 30

## 2023-11-03 NOTE — PROCEDURE: TREATMENT REGIMEN
Initiate Treatment: Fexofenadine 180 mg BID
Plan: Discussed starting dupixent.
Detail Level: Zone
Initiate Treatment: betamethasone, augmented 0.05 % topical cream BID\\ntriamcinolone acetonide 0.1 % topical cream BID\\ntacrolimus 0.1 % topical ointment BID

## 2023-11-13 ENCOUNTER — TELEPHONE (OUTPATIENT)
Dept: TRANSPLANT | Facility: CLINIC | Age: 28
End: 2023-11-13
Payer: MEDICARE

## 2023-11-13 NOTE — TELEPHONE ENCOUNTER
Pt called to inform us that he complete his derm visit at [facility] in [place]. I've requested clearance. Pt states that his cardiac wkup is schedule this week at [facility]. Will fax records once compete.

## 2023-12-01 ENCOUNTER — APPOINTMENT (RX ONLY)
Dept: URBAN - METROPOLITAN AREA CLINIC 158 | Facility: CLINIC | Age: 28
Setting detail: DERMATOLOGY
End: 2023-12-01

## 2023-12-01 VITALS — WEIGHT: 260.59 LBS | HEIGHT: 68 IN

## 2023-12-01 DIAGNOSIS — L28.1 PRURIGO NODULARIS: ICD-10-CM | Status: IMPROVED

## 2023-12-01 DIAGNOSIS — L20.89 OTHER ATOPIC DERMATITIS: ICD-10-CM | Status: IMPROVED

## 2023-12-01 PROCEDURE — 99214 OFFICE O/P EST MOD 30 MIN: CPT

## 2023-12-01 PROCEDURE — ? TREATMENT REGIMEN

## 2023-12-01 PROCEDURE — ? COUNSELING

## 2023-12-01 PROCEDURE — ? PRESCRIPTION

## 2023-12-01 RX ORDER — TRIAMCINOLONE ACETONIDE 1 MG/G
APPLY CREAM TOPICAL BID
Qty: 454 | Refills: 2 | Status: ERX

## 2023-12-01 RX ORDER — BETAMETHASONE DIPROPIONATE 0.5 MG/G
APPLY CREAM, AUGMENTED TOPICAL BID
Qty: 50 | Refills: 2 | Status: ERX

## 2023-12-01 RX ORDER — FEXOFENADINE HYDROCHLORIDE 180 MG/1
1 TABLET ORAL BID
Qty: 60 | Refills: 1 | Status: ERX

## 2023-12-01 ASSESSMENT — LOCATION ZONE DERM
LOCATION ZONE: ARM
LOCATION ZONE: TRUNK

## 2023-12-01 ASSESSMENT — LOCATION SIMPLE DESCRIPTION DERM
LOCATION SIMPLE: LOWER BACK
LOCATION SIMPLE: RIGHT FOREARM
LOCATION SIMPLE: LEFT UPPER ARM
LOCATION SIMPLE: LEFT LOWER BACK
LOCATION SIMPLE: LEFT FOREARM
LOCATION SIMPLE: LEFT UPPER BACK
LOCATION SIMPLE: RIGHT UPPER ARM
LOCATION SIMPLE: UPPER BACK
LOCATION SIMPLE: ABDOMEN

## 2023-12-01 ASSESSMENT — LOCATION DETAILED DESCRIPTION DERM
LOCATION DETAILED: LEFT ANTERIOR DISTAL UPPER ARM
LOCATION DETAILED: LEFT INFERIOR MEDIAL MIDBACK
LOCATION DETAILED: INFERIOR THORACIC SPINE
LOCATION DETAILED: EPIGASTRIC SKIN
LOCATION DETAILED: SUPERIOR LUMBAR SPINE
LOCATION DETAILED: RIGHT ANTERIOR DISTAL UPPER ARM
LOCATION DETAILED: RIGHT VENTRAL PROXIMAL FOREARM
LOCATION DETAILED: LEFT VENTRAL PROXIMAL FOREARM
LOCATION DETAILED: PERIUMBILICAL SKIN
LOCATION DETAILED: LEFT SUPERIOR UPPER BACK

## 2023-12-01 ASSESSMENT — ITCH NUMERIC RATING SCALE: HOW SEVERE IS YOUR ITCHING?: 0

## 2023-12-01 ASSESSMENT — ITCH INTENSITY: HOW SEVERE IS YOUR ITCHING?: 0

## 2023-12-01 ASSESSMENT — SEVERITY ASSESSMENT 2020: SEVERITY 2020: CLEAR

## 2023-12-01 NOTE — PROCEDURE: TREATMENT REGIMEN
Continue Regimen: betamethasone, augmented 0.05 % topical cream BID\\ntriamcinolone acetonide 0.1 % topical cream BID\\ntacrolimus 0.1 % topical ointment BID
Detail Level: Zone
Initiate Treatment: Fexofenadine 180 mg BID

## 2023-12-04 ENCOUNTER — EPISODE CHANGES (OUTPATIENT)
Dept: TRANSPLANT | Facility: CLINIC | Age: 28
End: 2023-12-04

## 2023-12-05 ENCOUNTER — TELEPHONE (OUTPATIENT)
Dept: TRANSPLANT | Facility: CLINIC | Age: 28
End: 2023-12-05
Payer: MEDICARE

## 2023-12-05 NOTE — TELEPHONE ENCOUNTER
MA notes per Adherence form     FOR THE PAST THREE MONTHS:    0-AMA's  0-No-shows    No concerns with care giving, transportation, or mental health    Scanned in pt's media    Safia Montoya  Abdominal Transplant MA

## 2023-12-08 ENCOUNTER — TELEPHONE (OUTPATIENT)
Dept: TRANSPLANT | Facility: CLINIC | Age: 28
End: 2023-12-08
Payer: MEDICARE

## 2023-12-08 ENCOUNTER — COMMITTEE REVIEW (OUTPATIENT)
Dept: TRANSPLANT | Facility: CLINIC | Age: 28
End: 2023-12-08
Payer: MEDICARE

## 2023-12-08 NOTE — COMMITTEE REVIEW
"Native Organ Dx: C3 Glomerulonephritis       SELECTION COMMITTEE NOTE    Cholo Jackson was presented at selection committee on 12/8/2023.  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  No absolute contraindications to transplant at this time.  Patient will be placed on the cadaveric wait list pending cardiology clarification regarding nuclear stress test "probably a normal stress test"  and final financial approval from insurance company.  Patient will return to clinic for routine appointment in 1 year(s). Patient does not meet criteria for High KDPI kidney offer. Patient does meet HCV+ donor offer. Patient does not meet criteria for dual/enbloc, due to age and weight. Planned immunosuppression Thymo.    Discuss with patient  C3 Glomerulonephritis biopsy results has high risk of recurrence (50%)     nuclear stress test "probably a normal stress test"     Patient informed of committee's decision. Discussed C3 Glomerulonephritis  has high risk of recurrence (50%). answered all questions.  Patient wasn't aware of diagnosis. Encouraged to reach out to his primary nephrologist for more insight. Patient voiced understanding.  Also sent letter and spoken to  at pt's cardiologist regarding clarification of stress. Awaiting a call back.        Note written by Jud Lopez RN  ===============================================    I was present at the meeting and attest to the decision of the committee.  "

## 2023-12-08 NOTE — LETTER
December 8, 2023    Dr. Ganesh Arambula  [address] [facility] [address]  Phone: [phone]  Fax: [phone]     Dear Dr. Ganesh Arambula    Patient: Cholo Jackson   MR Number: [id number]   YOB: 1995     Your patient, Cholo Jackson, was recently discussed at the [facility] Selection Committee. I am happy to inform you that Cholo has been approved for transplantation pending  cardiology clarification regarding patient's nuclear stress test .  He has met selection criteria for a kidney transplant related to ESRD secondary to primary diagnosis of Other, Specify - C3 Glomerulonephritis. Your patient will be placed on the cadaveric wait list pending final financial approval from insurance company.     We appreciate your confidence in allowing us to participate in your patients care.      If you have any questions or concerns, please do not hesitate to contact me.    Sincerely,    Benita Gaitan MD  Section Head, Cardiomyopathy & Heart Transplantation  Medical Director, Mechanical Circulatory Support Program    Cc: Cholo Jackson (Patient)          [facility]

## 2023-12-15 ENCOUNTER — TELEPHONE (OUTPATIENT)
Dept: TRANSPLANT | Facility: HOSPITAL | Age: 28
End: 2023-12-15
Payer: MEDICARE

## 2023-12-15 NOTE — TELEPHONE ENCOUNTER
" received the following in basket message:     "Patient would like to speak to SW in regards to living arrangements after transplant."     spoke with patient over the phone. Patient reported having questions about pricing for [address].  reviewed sliding scale with patient and encouraged patient to also look at additional housing options. Patient voiced understanding with this and denied additional needs, or concerns at this time. Patient reports he will reach back out to transplant team as needed.       Zeina Escamilla LMSW   "

## 2024-01-03 ENCOUNTER — TELEPHONE (OUTPATIENT)
Dept: TRANSPLANT | Facility: CLINIC | Age: 29
End: 2024-01-03
Payer: MEDICARE

## 2024-01-03 NOTE — TELEPHONE ENCOUNTER
Patient instructed to contact his insurance company for an update since clinicals were sent 3 weeks ago. I also sent a message to out [facility] for follow up. Patient voiced understanding.   ----- Message from Jana Castanon sent at 1/3/2024 11:09 AM CST -----  Regarding: speak to Nurse  Contact: PT  [phone]  The patient called requesting to speak to Nurse regarding if you had heard back from his insurance company. Please call at your convenience     No further information provided      Patient can be contacted @# [phone]

## 2024-01-10 ENCOUNTER — TELEPHONE (OUTPATIENT)
Dept: TRANSPLANT | Facility: CLINIC | Age: 29
End: 2024-01-10
Payer: MEDICARE

## 2024-01-10 DIAGNOSIS — Z76.82 AWAITING ORGAN TRANSPLANT STATUS: Primary | ICD-10-CM

## 2024-01-10 DIAGNOSIS — Z76.82 ORGAN TRANSPLANT CANDIDATE: Primary | ICD-10-CM

## 2024-01-10 NOTE — LETTER
January 10, 2024      Cholo Jackson  [address] [place] [address]        Dear Riruizcarmen Jackson:  MRN: [id number]    You were previously notified of the important UNOS (United Network for Organ Sharing) Policy change that may affect the waiting time for some candidates on our organ transplant waiting list.     The amount of time that a candidate has been waiting for a kidney is an important factor in identifying who receives offers for kidneys. A wait time that is long in duration may increase your chance of getting an offer. Wait time is, in part, based on a test called eGFR that tells how well your kidneys are working. Wait time could also be based on how long you have been on dialysis.       Under the policy change referenced above, government and health officials have changed the way this test is used. Before this year, hospitals used an eGFR that took into account the patients race. For Black or  Americans, this eGFR could have shown that their kidneys were working better than they in fact were, which could have resulted in the candidate accruing no wait time.  With this change, which now affects [facility] practices, as well, the eGFR for Black or  candidates more fairly measures your kidney function and your wait time.    A review of your medical record was completed. Although previous lab results were available, they did not meet the UNOS criteria to request an adjustment of your waiting time.     Who can I talk to if I have a question?  You can contact us if you have other questions.  Our phone number is [phone] or send a message through [facility].     Please give us time to answer your questions. We are working on this for many patients.    How can I learn more about eGFR and this policy change?  Go to OPTN website > Patients > Kidney > FAQ: Understanding race-neutral eGFR calculations  Full URL:  https://optn.transplant.[facility].gov/patients/by-organ/kidney/understanding-the-proposal-to-require-race-neutral-egfr-calculations/  Call the Organ Procurement and Transplantation Network (OPTN) toll-free patient services line at 1-592.745.2996    Sincerely,  Your [facility] Team

## 2024-01-10 NOTE — TELEPHONE ENCOUNTER
"  KIDNEY WAIT LISTING NOTE    Date of Financial clearance to list: 1/3/24    SSN/[facility]:     Organ: Kidney    Last Name: Manuel  First Name: Cholo    : 1995       Gender: male        MRN#: [id number]                                   State of Permanent Residence: [address] [place] [address] [address] [place] [place] [address]    Ethnicity: Not  or /a   Race:      Black or     CLINICAL INFORMATION   Candidate Medical Urgency Status: Active (1)  Number of Previous Kidney Transplants: 0  Number of Previous Solid Organ Transplants: 0  Did you enter number of previous kidney or other solid organ transplants? Yes  Is this Candidate a Prior Living Donor: No  (If yes, please generate letter to UNOS with patient's date of donation, recipient SSN, signed by Surgical Director after patient is listed in order to receive priority points).      ABO  ABO Blood Group:   O POS     ABO Confirmation: (THESE DATES MUST BE PRIOR TO THE LIST DATE AND SUPPORTED BY SEPARATE LAB REPORTS)    Internal Results    Lab Results   Component Value Date    GROUPTRH O POS 10/11/2023     No results found for: "ABO"    External Results    ABO Date 1: 23   ABO Date 2  Are either of these ABO results based on External Labs? Yes  (If Yes, STOP and go to source document in Media Tab for verification).    VITALS  Height:  5' 8.8"  Weight:  256 lbs  (Use height from Transplant clinic visits only).  Did you enter height/weight? Yes    HLA    Class I:  Lab Results   Component Value Date    NLSM9RV 30 10/11/2023    QCGQ3XI XX 10/11/2023    UWGF6YX 18 10/11/2023    YIOH6DC 45 10/11/2023    QOAMW7RR 6 10/11/2023    MXVIK6AS XX 10/11/2023    ZEGIO9ZV 5 10/11/2023    CYSTN7BM 16 10/11/2023       Class II:  Lab Results   Component Value Date    LXJBGT95DX 7 10/11/2023    NTMAEU51HJ 11 10/11/2023    YEKSWK245AT 52 10/11/2023    VGSPBZ9037 53 10/11/2023    GKQZU6TB 2 10/11/2023    LDAJP9MC 6 10/11/2023       Tested for HLA Antibodies: Yes, no " "antibodies detected     If result is "Positive" antibodies are detected     If result is "Negative or questionable" no antibodies detected    No results found for: "CIPRAS", "CIIPRAS"    DIALYSIS INFORMATION  Is patient Pre-Dialysis: No     GFR Information  Report GFR being used as the criteria for placement on the kidney list. If not, leave blank  GFR < or = 20 ml/min? No  If Yes, Specify value  ___   ml/min     Initial date GFR became 20 or less:   Is GFR obtained from an Outside lab Result? n/a  (If YES verify with source document scanned into media)    If patient on Dialysis:    Is candidate currently on dialysis for ESRD? Yes  If Yes,  Date Chronic Dialysis Started:   12/18/2021  (verify with source document in Media Tab)   Dialysis Unit Name: [facility]  [address]  [place] [address]                        Physician Name:  Dr. Itzel Robb  NPI#: [id number]    DIABETES INFORMATION  Primary Native Kidney Diagnosis: Other, Specify - C3 Glomerulonephritis  C-Peptide Value - No results found for: "CPEPTIDE"  Current Diabetes Status: None    FOR NON-KIDNEY DEPARTMENT USE ONLY:  Additional Organs Registered? none    Maximum Acceptable Number of HLA Mismatches  ABDR:     6      (0-6)               AB:               (0-4)  ADR:   _____  (0-4)              BDR: _____ (0-4)  A:        _____  (0-2)              B:      _____ (0-2)          DR: ______ (0-2)    Will Recipient Accept?   Accept HBcAB Positive Organ:            Yes  Accept HBV LAILA Positive Organ:        No  Accept HCV Antibody Positive Organ: Yes   Accept HCV LAILA Positive Organ: Yes    Dual Kidney and En Bloc Opt In : No  Dual  Local:   No  Dual Import:   No  En Bloc Local:   No  En Bloc Import: No     Accept KDPI > 85: Single: No     Local: No     Import: No  Accept KDPI > 85: Dual: No     Local: No     Import: No    ### NURSE TO VERIFY CONSENT AND MAKE ANY NECESSARY CHANGES NEEDED IN UNET AT THE TIME OF VERIFICATION " "###    Unacceptible Antigens  If yes, list     No results found for: "II5EBKM", "CIABCLM", "CIIAB"    ### DO NOT LIST IF ANTIGEN VALUE WEAK ###    Hep B Vaccine series completed: yes    Blood Type x2 was verified by myself and Ritika.  Blood type determination and reporting was completed according to the programs protocols and OPTN requirements.         "

## 2024-01-10 NOTE — LETTER
January 10, 2024    Cholo Jackson  [address] [address]    Dear Cholo Jackson:  MRN: [id number]    Congratulations! On 1/10/2024, you were placed on  the waiting list for a  donor transplant.    Your candidacy for kidney transplant is based on the following criteria: ESRD due to C3 Glomerulonephritis.    Your transplant coordinator while on the waiting list is Verito Hayes RN. They can be reached at [phone] or [phone] with any questions.      What to do now?    Ask your living donors to call and begin testing  Give your donors our phone number, [phone]  Make sure your donors have your name and date of birth when they call  You will get transplanted much faster if you have a living donor    Have your blood sent to our Transplant Lab every month  If you are on dialysis - our Transplant Lab will work with your dialysis unit to send your blood every month  If you are not on dialysis   If you live near an [facility] lab, we will schedule you to have blood drawn every month  If you do not live near an [facility] lab, you will be sent blood kits in the mail. You will need to take a kit to your local lab or doctor to have your blood drawn every month and mail to the Transplant Lab.     Call us with ANY CHANGES  Phone numbers - we must be able to reach you anytime of the day or night when a kidney is available  Address  Insurance coverage  Dialysis unit or kidney doctor  Jono: if you have surgery, stay in the hospital, have to get blood, or have an infection    Review your Kidney/Kidney-Pancreas Transplant Guide   This will give you detailed information about what happens when  you are on the waiting list   you are called when a kidney is available    The [facility] has a transplant surgeon and physician available 365 days a year, 24 hours a day to coordinate organ acceptance, procurement, surgical placement and to address urgent patient care issues.  You will  be notified in writing of any changes to our Transplant Centers staffing plan that would impact your ability to receive a transplant.    Attached is a letter from the United Network for Organ Sharing (UNOS). It describes the services and information offered to patients by UNOS and the Organ Procurement and Transplant Network. We look forward to working with you while on the waiting list.     We would like to inform you of an important OPTN (Organ Procurement and Transplant Network) Policy change that may affect the waiting time for some candidates on our waiting list.     Waiting time is important in identifying who receives offers for kidneys. A long wait time may increase your chance of getting an offer. Wait time is based on a test called eGFR that tells how well your kidneys are working. Wait time could also be based on how long you have been on dialysis. Government and health officials have changed the way this test is used. Before this year, hospitals used an eGFR that would include your race. For Black or  Americans, this eGFR could have shown that their kidneys were working better than they were.    Because of this change, we are looking at everyones record and assessing waiting time for people who are eligible. We will be reviewing everyones medical records and will contact you if you are eligible.     Who can I talk to if I have a question?  You can contact us if you have questions or send a message through [facility].     Please give us time to answer your questions. We are working on this for many patients.    How can I learn more about eGFR and this policy change?  Go to OPTN website > Patients > Kidney > FAQ: Understanding race-neutral eGFR calculations  Full URL: https://optn.transplant.hrsa.gov/patients/by-organ/kidney/understanding-the-proposal-to-require-race-neutral-egfr-calculations/  Call the Organ Procurement and Transplantation Network (OPTN) toll-free patient services line at  9-887-370-0789    Congratulations,    Your Transplant Partner  [facility]   [facility] [place] [address]  [phone]    Cc: Dr. Ganesh Arambula         [facility]                                                            The Organ Procurement and Transplantation Network   Toll-free patient services line: 5-964-248-8808  Your resource for organ transplant information      Staffed 8:30 am - 5:00 pm ET Monday - Friday   Leave a message 24/7 to receive a call back    The Organ Procurement and Transplantation Network (OPTN) is the national transplant system. It makes the policies that decide how donated organs are matched to patients waiting for a transplant. The OPTN:    Makes sure donated organs get matched to people on the transplant waiting list  Tells people about the donation and transplant processes  Makes sure that the public knows about the need for more organ and tissue donations    The OPTN has a free patient services line that you can call to:  Get more information about:   o Organ donation and organ transplants   o Donation and transplant policies  Get an information kit with:   o A list of transplant hospitals   o Waiting list information  Talk about any questions you may have about your transplant hospital or organ procurement organization. The staff will do their best to help you or point you to others who may help.  Find out how you can volunteer with the OPTN and help shape transplant policy    The patient services line number is: 6-373-963-2091    Patient services line staff CANNOT answer questions about your own medical care, including:  Waiting list status  Test results  Medical records  You will need to call your transplant hospital for this information.    The following websites have more information about transplantation and donation:  OPTN: https://optn.transplant.[facility].gov/  For potential living donors and transplant  recipients:   o Living with transplant: https://www.transplantliving.org/   o Living donation process: https://optn.transplant.hrsa.gov/living-donation/     o Financial assistance: https://www.livingdonorassistance.org/  Transplantation data: https://www.srtr.org/  Organ donation: https://www.organdonor.gov/    Volunteer with the OPTN: https://optn.transplant.hrsa.gov/get-involved

## 2024-01-22 PROCEDURE — 86832 HLA CLASS I HIGH DEFIN QUAL: CPT | Mod: TXP | Performed by: NURSE PRACTITIONER

## 2024-01-22 PROCEDURE — 86833 HLA CLASS II HIGH DEFIN QUAL: CPT | Mod: TXP | Performed by: NURSE PRACTITIONER

## 2024-01-26 ENCOUNTER — LAB VISIT (OUTPATIENT)
Dept: LAB | Facility: HOSPITAL | Age: 29
End: 2024-01-26
Payer: MEDICARE

## 2024-01-26 DIAGNOSIS — Z76.82 AWAITING ORGAN TRANSPLANT STATUS: ICD-10-CM

## 2024-02-02 PROCEDURE — 86832 HLA CLASS I HIGH DEFIN QUAL: CPT | Mod: TXP | Performed by: NURSE PRACTITIONER

## 2024-02-02 PROCEDURE — 86833 HLA CLASS II HIGH DEFIN QUAL: CPT | Mod: TXP | Performed by: NURSE PRACTITIONER

## 2024-02-07 ENCOUNTER — LAB VISIT (OUTPATIENT)
Dept: LAB | Facility: HOSPITAL | Age: 29
End: 2024-02-07
Payer: MEDICARE

## 2024-02-07 DIAGNOSIS — Z76.82 AWAITING ORGAN TRANSPLANT STATUS: ICD-10-CM

## 2024-02-19 LAB — HPRA INTERPRETATION: NORMAL

## 2024-02-29 ENCOUNTER — APPOINTMENT (RX ONLY)
Dept: URBAN - METROPOLITAN AREA CLINIC 158 | Facility: CLINIC | Age: 29
Setting detail: DERMATOLOGY
End: 2024-02-29

## 2024-02-29 VITALS — WEIGHT: 260 LBS | HEIGHT: 68 IN

## 2024-02-29 DIAGNOSIS — L28.1 PRURIGO NODULARIS: ICD-10-CM | Status: WELL CONTROLLED

## 2024-02-29 DIAGNOSIS — L20.89 OTHER ATOPIC DERMATITIS: ICD-10-CM | Status: WELL CONTROLLED

## 2024-02-29 LAB — HPRA INTERPRETATION: NORMAL

## 2024-02-29 PROCEDURE — ? COUNSELING

## 2024-02-29 PROCEDURE — 99214 OFFICE O/P EST MOD 30 MIN: CPT

## 2024-02-29 PROCEDURE — ? TREATMENT REGIMEN

## 2024-02-29 PROCEDURE — ? PRESCRIPTION

## 2024-02-29 RX ORDER — FEXOFENADINE HYDROCHLORIDE 180 MG/1
1 TABLET ORAL BID
Qty: 60 | Refills: 11 | Status: ERX

## 2024-02-29 RX ORDER — BETAMETHASONE DIPROPIONATE 0.5 MG/G
APPLY CREAM, AUGMENTED TOPICAL BID
Qty: 50 | Refills: 11 | Status: ERX

## 2024-02-29 RX ORDER — TRIAMCINOLONE ACETONIDE 1 MG/G
APPLY CREAM TOPICAL BID
Qty: 454 | Refills: 11 | Status: ERX

## 2024-02-29 ASSESSMENT — LOCATION DETAILED DESCRIPTION DERM
LOCATION DETAILED: RIGHT ANTERIOR DISTAL UPPER ARM
LOCATION DETAILED: LEFT SUPERIOR UPPER BACK
LOCATION DETAILED: RIGHT VENTRAL PROXIMAL FOREARM
LOCATION DETAILED: LEFT ANTERIOR DISTAL UPPER ARM
LOCATION DETAILED: SUPERIOR LUMBAR SPINE
LOCATION DETAILED: EPIGASTRIC SKIN
LOCATION DETAILED: LEFT VENTRAL PROXIMAL FOREARM
LOCATION DETAILED: LEFT INFERIOR MEDIAL MIDBACK
LOCATION DETAILED: INFERIOR THORACIC SPINE
LOCATION DETAILED: PERIUMBILICAL SKIN

## 2024-02-29 ASSESSMENT — LOCATION SIMPLE DESCRIPTION DERM
LOCATION SIMPLE: RIGHT UPPER ARM
LOCATION SIMPLE: LOWER BACK
LOCATION SIMPLE: LEFT UPPER ARM
LOCATION SIMPLE: UPPER BACK
LOCATION SIMPLE: LEFT FOREARM
LOCATION SIMPLE: LEFT LOWER BACK
LOCATION SIMPLE: RIGHT FOREARM
LOCATION SIMPLE: ABDOMEN
LOCATION SIMPLE: LEFT UPPER BACK

## 2024-02-29 ASSESSMENT — SEVERITY ASSESSMENT 2020: SEVERITY 2020: MILD

## 2024-02-29 ASSESSMENT — LOCATION ZONE DERM
LOCATION ZONE: ARM
LOCATION ZONE: TRUNK

## 2024-02-29 ASSESSMENT — ITCH NUMERIC RATING SCALE: HOW SEVERE IS YOUR ITCHING?: 0

## 2024-02-29 ASSESSMENT — BSA ECZEMA: % BODY COVERED IN ECZEMA: 5

## 2024-02-29 NOTE — PROCEDURE: TREATMENT REGIMEN
Continue Regimen: Fexofenadine 180 mg BID
Continue Regimen: betamethasone, augmented 0.05 % topical cream BID\\ntriamcinolone acetonide 0.1 % topical cream BID\\ntacrolimus 0.1 % topical ointment BID
Detail Level: Zone

## 2024-05-31 LAB
CLASS I ANTIBODIES - LUMINEX: NEGATIVE
CLASS II ANTIBODY COMMENTS - LUMINEX: NORMAL
CPRA %: 0
SERUM COLLECTION DT - LUMINEX CLASS I: NORMAL
SERUM COLLECTION DT - LUMINEX CLASS II: NORMAL
SPCL1 TESTING DATE: NORMAL
SPCL2 TESTING DATE: NORMAL
SPCLU TESTING DATE: NORMAL

## 2024-06-18 LAB
CLASS I ANTIBODY COMMENTS - LUMINEX: NORMAL
CLASS II ANTIBODY COMMENTS - LUMINEX: NORMAL
CPRA %: 0
SERUM COLLECTION DT - LUMINEX CLASS I: NORMAL
SERUM COLLECTION DT - LUMINEX CLASS II: NORMAL
SPCL1 TESTING DATE: NORMAL
SPCL2 TESTING DATE: NORMAL
SPCLU TESTING DATE: NORMAL

## 2024-07-24 DIAGNOSIS — Z76.82 ORGAN TRANSPLANT CANDIDATE: Primary | ICD-10-CM

## 2024-08-06 ENCOUNTER — TELEPHONE (OUTPATIENT)
Dept: TRANSPLANT | Facility: CLINIC | Age: 29
End: 2024-08-06
Payer: MEDICARE

## 2024-08-20 ENCOUNTER — TELEPHONE (OUTPATIENT)
Dept: TRANSPLANT | Facility: CLINIC | Age: 29
End: 2024-08-20
Payer: MEDICARE

## 2024-08-20 NOTE — TELEPHONE ENCOUNTER
"Spoke to pt confirming it is ok to bring his brother in place of his mother for his care giver.    ----- Message from Latrell Alfaro sent at 8/20/2024 11:23 AM CDT -----  Consult/Advisory    Name Of Caller: Self    Contact Preference?: [phone]     What is the nature of the call?: Calling to discuss 10/4 appts; Stating this isn't about r/s      Additional Notes: Pt refused to provide any further details    "Thank you for all that you do for our patients"  "

## 2024-09-23 ENCOUNTER — TELEPHONE (OUTPATIENT)
Dept: TRANSPLANT | Facility: CLINIC | Age: 29
End: 2024-09-23
Payer: MEDICARE

## 2024-09-23 NOTE — TELEPHONE ENCOUNTER
Spoke to pt confirming rescheduled test and clinic visit appts on 12/06/2024. Appt reminders were mailed and pt is aware to bring care giver.

## 2024-10-02 ENCOUNTER — TELEPHONE (OUTPATIENT)
Dept: TRANSPLANT | Facility: CLINIC | Age: 29
End: 2024-10-02
Payer: MEDICARE

## 2024-10-02 NOTE — LETTER
2024    Cholo Jackson  [address]  [address]  [place] [address]        Dear Cholo Jackson:  MRN: [id number]  : 1995    You are scheduled on 2024 for follow up in the transplant clinic to update your records and evaluate your health status as you wait for a transplant.  It is very important that we ensure you are ready for your transplant.      Please make arrangements to be in our transplant clinic from 12:30 pm- 4 pm.  During this time you will watch an educational video and then be seen by our transplant , financial counselor, and advance practice provider.     If you have had a change in your medical history since your last visit, please call your transplant coordinator to ensure we have the medical records to review prior to your appointment.     Please bring the following with you to this appointment:  A Caregiver is REQUIRED  Bring ALL insurance information including medication card  Current list of medications  Copies of any outside medical records from the past year, such as: mammogram, pap smear, ultrasound, CAT scan, colonoscopy, cardiac angiogram or cardiac stress test    To reschedule your appointments please call [phone] or [phone]. [phone]). Please ask for the .      Failure to cancel in advance or arrive on time could result in a $50 cancellation fee.  Your transplant candidacy could be affected by no showing these appointments.  We look forward to seeing you.    Sincerely,    [facility] [place]  [facility] [place] [address]  [phone]

## 2024-10-02 NOTE — LETTER
2024    Cholo Jackson  [address]  [address]  [place] [address]        Dear Cholo Jackson:  MRN: [id number]  : 1995    You are scheduled on 2024   for follow up in the transplant clinic to update your records and evaluate your health status as you wait for a transplant.  It is very important that we ensure you are ready for your transplant.      Please make arrangements to be in our transplant clinic from 12:30 pm- 4 pm.  During this time you will watch an educational video and then be seen by our transplant , financial counselor, and advance practice provider.     If you have had a change in your medical history since your last visit, please call your transplant coordinator to ensure we have the medical records to review prior to your appointment.     Please bring the following with you to this appointment:  A Caregiver is REQUIRED  Bring ALL insurance information including medication card  Current list of medications  Copies of any outside medical records from the past year, such as: mammogram, pap smear, ultrasound, CAT scan, colonoscopy, cardiac angiogram or cardiac stress test    To reschedule your appointments please call [phone] or [phone] ([phone]. [phone]). Please ask for the .      Failure to cancel in advance or arrive on time could result in a $50 cancellation fee.  Your transplant candidacy could be affected by no showing these appointments.  We look forward to seeing you.    Sincerely,    [facility] [place]  [facility] [place] [address]  [phone]

## 2024-12-10 ENCOUNTER — TELEPHONE (OUTPATIENT)
Dept: TRANSPLANT | Facility: CLINIC | Age: 29
End: 2024-12-10
Payer: MEDICARE

## 2024-12-10 NOTE — LETTER
December 10, 2024    Cholo Jackson  [address]  [address]  [place] [address]        Dear Cholo Jackson:  MRN: [id number]  : 1995    You are scheduled on 2025 for follow up in the transplant clinic to update your records and evaluate your health status as you wait for a transplant.  It is very important that we ensure you are ready for your transplant.      Please make arrangements to be in our transplant clinic from 12:30 pm- 4 pm.  During this time you will watch an educational video and then be seen by our transplant , financial counselor, and advance practice provider.     If you have had a change in your medical history since your last visit, please call your transplant coordinator to ensure we have the medical records to review prior to your appointment.     Please bring the following with you to this appointment:  A Caregiver is REQUIRED  Bring ALL insurance information including medication card  Current list of medications  Copies of any outside medical records from the past year, such as: mammogram, pap smear, ultrasound, CAT scan, colonoscopy, cardiac angiogram or cardiac stress test    To reschedule your appointments please call [phone] or [phone]. [phone]). Please ask for the .      Failure to cancel in advance or arrive on time could result in a $50 cancellation fee.  Your transplant candidacy could be affected by no showing these appointments.  We look forward to seeing you.    Sincerely,    [facility] [place]  [facility] [place] [address]  [phone]

## 2025-01-22 ENCOUNTER — TELEPHONE (OUTPATIENT)
Dept: TRANSPLANT | Facility: CLINIC | Age: 30
End: 2025-01-22
Payer: MEDICARE

## 2025-01-24 DIAGNOSIS — Z76.82 ORGAN TRANSPLANT CANDIDATE: ICD-10-CM

## 2025-01-24 DIAGNOSIS — N18.6 END STAGE RENAL DISEASE: Primary | ICD-10-CM

## 2025-01-27 ENCOUNTER — TELEPHONE (OUTPATIENT)
Dept: TRANSPLANT | Facility: CLINIC | Age: 30
End: 2025-01-27
Payer: MEDICARE

## 2025-01-27 NOTE — TELEPHONE ENCOUNTER
Spoke to pt confirming rescheduled annual test and clinic visit on 03/28/2025. Appt reminders were mailed and pt is aware to bring care giver.

## 2025-02-28 ENCOUNTER — APPOINTMENT (OUTPATIENT)
Dept: URBAN - METROPOLITAN AREA CLINIC 183 | Facility: CLINIC | Age: 30
Setting detail: DERMATOLOGY
End: 2025-02-28

## 2025-02-28 VITALS — HEIGHT: 68 IN | WEIGHT: 262 LBS

## 2025-02-28 DIAGNOSIS — L20.89 OTHER ATOPIC DERMATITIS: ICD-10-CM | Status: IMPROVED

## 2025-02-28 DIAGNOSIS — L28.1 PRURIGO NODULARIS: ICD-10-CM

## 2025-02-28 PROCEDURE — 99214 OFFICE O/P EST MOD 30 MIN: CPT

## 2025-02-28 PROCEDURE — ? COUNSELING

## 2025-02-28 PROCEDURE — ? PRESCRIPTION

## 2025-02-28 PROCEDURE — ? TREATMENT REGIMEN

## 2025-02-28 RX ORDER — BETAMETHASONE DIPROPIONATE 0.5 MG/G
CREAM, AUGMENTED TOPICAL BID
Qty: 50 | Refills: 11 | Status: ACTIVE

## 2025-02-28 RX ORDER — TRIAMCINOLONE ACETONIDE 1 MG/G
CREAM TOPICAL BID
Qty: 454 | Refills: 11 | Status: ACTIVE

## 2025-02-28 ASSESSMENT — LOCATION SIMPLE DESCRIPTION DERM
LOCATION SIMPLE: RIGHT UPPER ARM
LOCATION SIMPLE: RIGHT FOREARM
LOCATION SIMPLE: LEFT UPPER ARM
LOCATION SIMPLE: ABDOMEN
LOCATION SIMPLE: LEFT LOWER BACK
LOCATION SIMPLE: LEFT UPPER BACK
LOCATION SIMPLE: LEFT FOREARM
LOCATION SIMPLE: UPPER BACK
LOCATION SIMPLE: LOWER BACK

## 2025-02-28 ASSESSMENT — LOCATION DETAILED DESCRIPTION DERM
LOCATION DETAILED: LEFT ANTERIOR DISTAL UPPER ARM
LOCATION DETAILED: LEFT SUPERIOR UPPER BACK
LOCATION DETAILED: LEFT INFERIOR MEDIAL MIDBACK
LOCATION DETAILED: RIGHT ANTERIOR DISTAL UPPER ARM
LOCATION DETAILED: PERIUMBILICAL SKIN
LOCATION DETAILED: INFERIOR THORACIC SPINE
LOCATION DETAILED: EPIGASTRIC SKIN
LOCATION DETAILED: LEFT VENTRAL PROXIMAL FOREARM
LOCATION DETAILED: RIGHT VENTRAL PROXIMAL FOREARM
LOCATION DETAILED: SUPERIOR LUMBAR SPINE

## 2025-02-28 ASSESSMENT — LOCATION ZONE DERM
LOCATION ZONE: TRUNK
LOCATION ZONE: ARM

## 2025-02-28 NOTE — PROCEDURE: TREATMENT REGIMEN
Continue Regimen: betamethasone, augmented 0.05 % topical cream BID\\ntriamcinolone acetonide 0.1 % topical cream BID\\ntacrolimus 0.1 % topical ointment BID
Detail Level: Zone
Continue Regimen: Fexofenadine 180 mg BID

## 2025-04-01 DIAGNOSIS — Z76.82 PRE-KIDNEY TRANSPLANT, LISTED: Primary | ICD-10-CM

## 2025-04-01 DIAGNOSIS — Z76.82 ORGAN TRANSPLANT CANDIDATE: ICD-10-CM

## 2025-04-01 DIAGNOSIS — N18.6 END STAGE RENAL DISEASE: Primary | ICD-10-CM

## 2025-04-08 ENCOUNTER — TELEPHONE (OUTPATIENT)
Dept: TRANSPLANT | Facility: CLINIC | Age: 30
End: 2025-04-08
Payer: MEDICARE

## 2025-04-08 DIAGNOSIS — Z76.82 PRE-KIDNEY TRANSPLANT, LISTED: Primary | ICD-10-CM

## 2025-04-08 NOTE — TELEPHONE ENCOUNTER
Spoke to pt confirming scheduled annual test and clinic visit on 06/13/2025. Appt reminders were mailed and pt is aware to bring care giver.

## 2025-04-08 NOTE — TELEPHONE ENCOUNTER
----- Message from Yanira sent at 4/8/2025 11:12 AM CDT -----  Regarding: Requesting a call back  Contact: [phone]  Consult/AdvisoryName Of Caller:Cholo Alex Preference:[phone] of call: Calling to check his status and next step to process

## 2025-04-08 NOTE — TELEPHONE ENCOUNTER
Updated visit will be scheduled today. Patient is drawing his HLA samples at home and sending in.

## 2025-05-16 ENCOUNTER — APPOINTMENT (OUTPATIENT)
Dept: URBAN - METROPOLITAN AREA CLINIC 183 | Facility: CLINIC | Age: 30
Setting detail: DERMATOLOGY
End: 2025-05-16

## 2025-05-16 VITALS — HEIGHT: 68 IN | WEIGHT: 266.76 LBS

## 2025-05-16 DIAGNOSIS — L28.1 PRURIGO NODULARIS: ICD-10-CM | Status: WELL CONTROLLED

## 2025-05-16 DIAGNOSIS — L259 CONTACT DERMATITIS AND OTHER ECZEMA, UNSPECIFIED CAUSE: ICD-10-CM | Status: INADEQUATELY CONTROLLED

## 2025-05-16 DIAGNOSIS — L20.89 OTHER ATOPIC DERMATITIS: ICD-10-CM | Status: WELL CONTROLLED

## 2025-05-16 PROBLEM — L23.9 ALLERGIC CONTACT DERMATITIS, UNSPECIFIED CAUSE: Status: ACTIVE | Noted: 2025-05-16

## 2025-05-16 PROCEDURE — ? PRESCRIPTION

## 2025-05-16 PROCEDURE — ? COUNSELING

## 2025-05-16 PROCEDURE — 99214 OFFICE O/P EST MOD 30 MIN: CPT

## 2025-05-16 PROCEDURE — ? TREATMENT REGIMEN

## 2025-05-16 RX ORDER — FLUOCINONIDE 0.5 MG/G
APPLY GEL TOPICAL BID
Qty: 15 | Refills: 1 | Status: ERX | COMMUNITY
Start: 2025-05-16

## 2025-05-16 RX ADMIN — FLUOCINONIDE APPLY: 0.5 GEL TOPICAL at 00:00

## 2025-05-16 ASSESSMENT — BSA ECZEMA: % BODY COVERED IN ECZEMA: 0

## 2025-05-16 ASSESSMENT — LOCATION DETAILED DESCRIPTION DERM
LOCATION DETAILED: LEFT SUPERIOR UPPER BACK
LOCATION DETAILED: LEFT ANTERIOR DISTAL UPPER ARM
LOCATION DETAILED: LEFT VENTRAL PROXIMAL FOREARM
LOCATION DETAILED: LEFT INFERIOR MEDIAL MIDBACK
LOCATION DETAILED: RIGHT ANTERIOR DISTAL UPPER ARM
LOCATION DETAILED: EPIGASTRIC SKIN
LOCATION DETAILED: PERIUMBILICAL SKIN
LOCATION DETAILED: LEFT CLAVICULAR SKIN
LOCATION DETAILED: SUPERIOR LUMBAR SPINE
LOCATION DETAILED: RIGHT VENTRAL PROXIMAL FOREARM
LOCATION DETAILED: INFERIOR THORACIC SPINE

## 2025-05-16 ASSESSMENT — LOCATION SIMPLE DESCRIPTION DERM
LOCATION SIMPLE: RIGHT UPPER ARM
LOCATION SIMPLE: LEFT CLAVICULAR SKIN
LOCATION SIMPLE: LEFT LOWER BACK
LOCATION SIMPLE: ABDOMEN
LOCATION SIMPLE: LEFT UPPER BACK
LOCATION SIMPLE: RIGHT FOREARM
LOCATION SIMPLE: LEFT UPPER ARM
LOCATION SIMPLE: LOWER BACK
LOCATION SIMPLE: LEFT FOREARM
LOCATION SIMPLE: UPPER BACK

## 2025-05-16 ASSESSMENT — LOCATION ZONE DERM
LOCATION ZONE: ARM
LOCATION ZONE: TRUNK

## 2025-05-16 ASSESSMENT — ITCH NUMERIC RATING SCALE: HOW SEVERE IS YOUR ITCHING?: 0

## 2025-05-16 ASSESSMENT — SEVERITY ASSESSMENT 2020: SEVERITY 2020: CLEAR

## 2025-05-16 NOTE — PROCEDURE: TREATMENT REGIMEN
Continue Regimen: betamethasone, augmented 0.05 % topical cream BID\\ntriamcinolone acetonide 0.1 % topical cream BID\\ntacrolimus 0.1 % topical ointment BID
Detail Level: Zone
Continue Regimen: Fexofenadine 180 mg BID
Plan: Pt has a rash where he has a dressing on his temperatory dialysis access. He changes every 48 hours. Provider gave him a cream to use; however, he cannot use it because the dressing will not stick. Will try using a gel so that he can treat when he changes the dressing
Initiate Treatment: fluocinonide 0.05 % topical gel BID

## 2025-06-03 ENCOUNTER — TELEPHONE (OUTPATIENT)
Dept: TRANSPLANT | Facility: CLINIC | Age: 30
End: 2025-06-03
Payer: MEDICARE

## 2025-06-13 ENCOUNTER — HOSPITAL ENCOUNTER (OUTPATIENT)
Dept: RADIOLOGY | Facility: HOSPITAL | Age: 30
Discharge: HOME | End: 2025-06-13
Attending: NURSE PRACTITIONER
Payer: MEDICARE

## 2025-06-13 ENCOUNTER — OFFICE VISIT (OUTPATIENT)
Dept: TRANSPLANT | Facility: CLINIC | Age: 30
End: 2025-06-13
Payer: MEDICARE

## 2025-06-13 ENCOUNTER — RESULTS FOLLOW-UP (OUTPATIENT)
Dept: TRANSPLANT | Facility: CLINIC | Age: 30
End: 2025-06-13

## 2025-06-13 VITALS
HEART RATE: 101 BPM | OXYGEN SATURATION: 100 % | WEIGHT: 269.38 LBS | SYSTOLIC BLOOD PRESSURE: 116 MMHG | TEMPERATURE: 98 F | RESPIRATION RATE: 18 BRPM | DIASTOLIC BLOOD PRESSURE: 54 MMHG | HEIGHT: 69 IN | BODY MASS INDEX: 39.9 KG/M2

## 2025-06-13 DIAGNOSIS — N18.6 ESRD ON HEMODIALYSIS: ICD-10-CM

## 2025-06-13 DIAGNOSIS — Z99.2 ANEMIA IN CHRONIC KIDNEY DISEASE, ON CHRONIC DIALYSIS: ICD-10-CM

## 2025-06-13 DIAGNOSIS — D63.1 ANEMIA IN CHRONIC KIDNEY DISEASE, ON CHRONIC DIALYSIS: ICD-10-CM

## 2025-06-13 DIAGNOSIS — Z76.82 ORGAN TRANSPLANT CANDIDATE: Primary | ICD-10-CM

## 2025-06-13 DIAGNOSIS — N18.6 ANEMIA IN CHRONIC KIDNEY DISEASE, ON CHRONIC DIALYSIS: ICD-10-CM

## 2025-06-13 DIAGNOSIS — Z76.82 ORGAN TRANSPLANT CANDIDATE: ICD-10-CM

## 2025-06-13 DIAGNOSIS — E66.812 CLASS 2 SEVERE OBESITY DUE TO EXCESS CALORIES WITH SERIOUS COMORBIDITY AND BODY MASS INDEX (BMI) OF 39.0 TO 39.9 IN ADULT: ICD-10-CM

## 2025-06-13 DIAGNOSIS — N25.81 SECONDARY HYPERPARATHYROIDISM OF RENAL ORIGIN: ICD-10-CM

## 2025-06-13 DIAGNOSIS — Z76.82 PATIENT ON WAITING LIST FOR KIDNEY TRANSPLANT: Primary | ICD-10-CM

## 2025-06-13 DIAGNOSIS — Z99.2 ESRD ON HEMODIALYSIS: ICD-10-CM

## 2025-06-13 DIAGNOSIS — N04.4: ICD-10-CM

## 2025-06-13 DIAGNOSIS — E66.01 CLASS 2 SEVERE OBESITY DUE TO EXCESS CALORIES WITH SERIOUS COMORBIDITY AND BODY MASS INDEX (BMI) OF 39.0 TO 39.9 IN ADULT: ICD-10-CM

## 2025-06-13 PROBLEM — B19.20 UNSPECIFIED VIRAL HEPATITIS C WITHOUT HEPATIC COMA: Status: RESOLVED | Noted: 2022-05-31 | Resolved: 2025-06-13

## 2025-06-13 PROBLEM — Z71.85 IMMUNIZATION COUNSELING: Status: RESOLVED | Noted: 2023-10-11 | Resolved: 2025-06-13

## 2025-06-13 PROBLEM — Z01.818 PRE-TRANSPLANT EVALUATION FOR CHRONIC KIDNEY DISEASE: Status: RESOLVED | Noted: 2023-10-11 | Resolved: 2025-06-13

## 2025-06-13 PROCEDURE — 1159F MED LIST DOCD IN RCRD: CPT | Mod: CPTII,S$GLB,TXP, | Performed by: NURSE PRACTITIONER

## 2025-06-13 PROCEDURE — 71046 X-RAY EXAM CHEST 2 VIEWS: CPT | Mod: TC,TXP

## 2025-06-13 PROCEDURE — 76770 US EXAM ABDO BACK WALL COMP: CPT | Mod: TC,TXP

## 2025-06-13 PROCEDURE — 71046 X-RAY EXAM CHEST 2 VIEWS: CPT | Mod: 26,TXP,, | Performed by: RADIOLOGY

## 2025-06-13 PROCEDURE — 74176 CT ABD & PELVIS W/O CONTRAST: CPT | Mod: 26,TXP,, | Performed by: RADIOLOGY

## 2025-06-13 PROCEDURE — 3078F DIAST BP <80 MM HG: CPT | Mod: CPTII,S$GLB,TXP, | Performed by: NURSE PRACTITIONER

## 2025-06-13 PROCEDURE — 99215 OFFICE O/P EST HI 40 MIN: CPT | Mod: S$GLB,TXP,, | Performed by: NURSE PRACTITIONER

## 2025-06-13 PROCEDURE — 76770 US EXAM ABDO BACK WALL COMP: CPT | Mod: 26,TXP,, | Performed by: STUDENT IN AN ORGANIZED HEALTH CARE EDUCATION/TRAINING PROGRAM

## 2025-06-13 PROCEDURE — 3008F BODY MASS INDEX DOCD: CPT | Mod: CPTII,S$GLB,TXP, | Performed by: NURSE PRACTITIONER

## 2025-06-13 PROCEDURE — 74176 CT ABD & PELVIS W/O CONTRAST: CPT | Mod: TC,TXP

## 2025-06-13 PROCEDURE — 3074F SYST BP LT 130 MM HG: CPT | Mod: CPTII,S$GLB,TXP, | Performed by: NURSE PRACTITIONER

## 2025-06-13 PROCEDURE — 99999 PR PBB SHADOW E&M-EST. PATIENT-LVL IV: CPT | Mod: PBBFAC,TXP,, | Performed by: NURSE PRACTITIONER

## 2025-06-13 NOTE — PROGRESS NOTES
Kidney Transplant Recipient Reevalulation    Referring Physician: Ganesh Arambula  Current Nephrologist: Ganesh Arambula  Waitlist Status: active  Dialysis Start Date: 12/18/2021    Subjective:     CC:  Annual reassessment of kidney transplant candidacy.    HPI:  Mr. Jackson is a 29 y.o. 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  He has been on the wait list for a kidney transplant at [facility] since 12/18/2021. Patient is currently on hemodialysis started on 12/18/21. Patient is dialyzing on TTS schedule.  Patient reports that he is tolerating dialysis well.. He has a left chest catheter and has  LUE AV fistula which keeps clotting off. He was referred to [facility] to see if he needs anticoagulation. F/u scheduled in July  Patient denies any recent hospitalizations or ED visits.    LOV 10/11/2023 in [facility] clinic   Fx assessment:unchanged, Over all looks good ot frail, does not exercise, likes to watch movies and play video games     Body mass index is 39.78 kg/m².  Encourage lifestyle modifications: diet, exercise, weight loss   Needs to maintain acceptable BMI for txp/guidelines       Bp  BP Readings from Last 3 Encounters:   06/13/25 (!) 116/54   10/11/23 132/74      Lab /diagnostic results /TXP [facility] reviewed    10/11/23 renal US: chronic renal disease   10/11/23 PXR: No evidence of an unusually advanced degree of iliac arterial calcification in the pelvis is observed   10/11/23 CXR: No significant intrathoracic abnormality   11/17/23 TTE EF 50-55%, PA 19 mmHg  11/17/23 Lexiscan:Probably a normal stress test, inferior attenuation noted on the image    Cardiology consult      12/11/23 Margaret Arriaza NP Risk acceptable from a cardiovascular standpoint. Okay to to proceed with transplant     Past Medical History:   Diagnosis Date    Alteration in nutrition     Anemia     Disorder of kidney and ureter     Eczema     Hyperlipidemia     Hypertension     Membranous glomerulonephritis      Patient scheduled for covid test "Obesity        Review of Systems   Constitutional:  Negative for activity change, appetite change, chills, fatigue, fever and unexpected weight change.   HENT:  Positive for hearing loss (Deaf left ear -- since birth). Negative for congestion, facial swelling, postnasal drip, rhinorrhea, sinus pressure, sore throat and trouble swallowing.    Eyes:  Positive for visual disturbance (wears glasses). Negative for pain and redness.   Respiratory:  Negative for cough, chest tightness, shortness of breath and wheezing.    Cardiovascular: Negative.  Negative for chest pain, palpitations and leg swelling.   Gastrointestinal:  Negative for abdominal pain, diarrhea, nausea and vomiting.   Genitourinary:  Positive for decreased urine volume (anuric). Negative for dysuria, flank pain and urgency.   Musculoskeletal:  Negative for gait problem, neck pain and neck stiffness.   Skin:  Negative for rash.        Hx eczema    Allergic/Immunologic: Negative for environmental allergies, food allergies and immunocompromised state.   Neurological:  Negative for dizziness, weakness, light-headedness and headaches.   Psychiatric/Behavioral:  Negative for agitation and confusion. The patient is not nervous/anxious.        Objective: BP (!) 116/54 (BP Location: Right arm, Patient Position: Sitting)   Pulse 101   Temp 98.3 °F (36.8 °C) (Oral)   Resp 18   Ht 5' 9" (1.753 m)   Wt 122.2 kg (269 lb 6.4 oz)   SpO2 100%   BMI 39.78 kg/m²      body mass index is 39.78 kg/m².    Physical Exam  Constitutional:       Appearance: Normal appearance. He is well-developed. He is obese.   HENT:      Head: Normocephalic.      Nose: Nose normal.   Eyes:      Conjunctiva/sclera: Conjunctivae normal.      Pupils: Pupils are equal, round, and reactive to light.   Cardiovascular:      Rate and Rhythm: Normal rate and regular rhythm.      Heart sounds: Normal heart sounds.   Pulmonary:      Effort: Pulmonary effort is normal.      Breath sounds: Normal breath " "sounds.   Abdominal:      General: Bowel sounds are normal.      Palpations: Abdomen is soft.   Musculoskeletal:        Arms:       Cervical back: Normal range of motion and neck supple.   Skin:     General: Skin is warm and dry.   Neurological:      Mental Status: He is alert and oriented to person, place, and time.   Psychiatric:         Behavior: Behavior normal.       Labs:  Lab Results   Component Value Date    WBC 6.87 10/11/2023    HGB 11.0 (L) 10/11/2023    HCT 34.8 (L) 10/11/2023     10/11/2023    K 3.5 10/11/2023     10/11/2023    CO2 25 10/11/2023    BUN 46 (H) 10/11/2023    CREATININE 18.0 (H) 10/11/2023    EGFRNORACEVR 3.3 (A) 10/11/2023    CALCIUM 8.9 10/11/2023    PHOS 8.4 (H) 10/11/2023    ALBUMIN 4.0 10/11/2023    AST 25 10/11/2023    ALT 24 10/11/2023    .8 (H) 10/11/2023       No results found for: "PREALBUMIN", "BILIRUBINUA", "GGT", "AMYLASE", "LIPASE", "PROTEINUA", "NITRITE", "RBCUA", "WBCUA"    No results found for: "HLAABCTYPE"    Lab Results   Component Value Date    CPRA 3 04/04/2025    UZ4LRHZ Negative 04/04/2025    CIABCLM WEAK: A11 10/04/2024    CIIAB DPB1*14:01 04/04/2025    ABCMT DR1 -- WEAK: DP5, DP1, DQ5 - 04/04/2025       Labs were reviewed with the patient.    Pre-transplant Workup:   Reviewed with the patient.    Assessment:     1. Patient on waiting list for kidney transplant    2. ESRD on hemodialysis    3. 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 Secondary hyperparathyroidism of renal origin    5. Class 2 severe obesity due to excess calories with serious comorbidity and body mass index (BMI) of 39.0 to 39.9 in adult    6. Anemia in chronic kidney disease, on chronic dialysis        Plan:   Discuss with patient  C3 Glomerulonephritis biopsy results has high risk of recurrence (50%)   Discussed needing to f/u annually per guidelines      has  LUE AV fistula which keeps clotting off. He was referred to [facility] to see if " he needs anticoagulation. [facility] scheduled in July  [facility]    -due for UTD TTE and stress test  CXR, Renal US and CTA/P WO scheduled this afternoon    Body mass index is 39.78 kg/m².  Encourage lifestyle modifications: diet, exercise, weight loss   Needs to maintain acceptable BMI for txp/guidelines     Transplant Candidacy:   Mr. Jackson is a suitable kidney transplant candidate.  Meets center eligibility for accepting HCV+ donor offer - Yes.  Patient educated on HCV+ donors. Cholo is willing  to accept HCV+ donor offer -  Yes   Patient is a candidate for KDPI > 85 kidney donor offer - No d/t age and weight  He remains in overall stable health, and will remain active on the transplant list.    Patient advised that it is recommended that all transplant candidates, and their close contacts and household members receive Covid vaccination.    Dariana Rosas NP       Follow-up:   In addition to the tests noted in the plan, Mr. Jackson will continue to have reevaluation as per the standing pre-kidney transplant protocol:  Monthly blood for PRA  Annual return to clinic, except HIV positive, > 65 years of age, or pancreas transplant candidates who will be scheduled to see transplant every 6 months while in pre-transplant phase  Annual re-testing: CXR, EKG, yearly mammograms for women over 40 and PSA for males over 40, cardiology follow-up as recommended by initial cardiology pre-transplant evaluation  Renal ultrasound every 2 years  Baseline colonoscopy after age 50 and repeated as recommended    UNOS Patient Status  Functional Status: 60% - Requires occasional assistance but is able to care for needs  Physical Capacity: No Limitations

## 2025-06-13 NOTE — PROGRESS NOTES
Transplant Psychosocial Evaluation Update:  Last psychosocial evaluation for transplant was completed on 10/11/2023 by Antonietta Tirado LCSW.     Pt presents today in company of brother Avendano. Pt and brother present as alert, oriented to person, place, time, purpose of visit. Pt and brother deny concerns about going through with transplant and state motivation and sense of preparedness for transplantation, caregiver role, psychosocial risk factors, medical risk factors, financial aspects, and lifetime commitments. All concerns and education points as per transplant psychosocial evaluation process addressed (also refer to psychosocial dated 10/11/2023). Pt actively participated in today's interview, with brother participating as caregiver. Pt asking questions appropriately and denies changes to previous psychosocial evaluation for transplantation which we reviewed line by line with pt and, per pt choice, with pts caregiver today.    Primary Caregivers and Transportation for Transplant:  1. lance Avendanoer, 27 yo, drives/own vehicle, [phone]   2. Rabia Haq, girlfriend, 26, drives/own vehicle, [phone]    Both pt and caregiver/s plan to stay locally at TBD location or return home - information reviewed in depth. Caregivers plan to stay at hospital as appropriate for transplant and post-transplant process.  explained in depth lodging requirements if doctors determine patient can't return home after transplant. Patient and caregiver would have to stay within 1 hour from hospital for 4-5 weeks.  provided patient with different lodging options to review. Patient and caregiver informed patient is responsible for any fees associated with lodging after transplant.    Pts Vocation: Patient is disabled due to ESRD.     DME: Home hemo equipment and supplies.     ADLS:  Patient is able to ambulate, complete ADL's, drive and manage medications without  assistance.    Household and Dependents: Patient resides with Elpidio Hare and Ken Oliver, brothers and has no dependents at this time.    Income: Pt states ability to afford all monthly expenses and costs including for medications now and if transplanted based on income and on savings and assets. Patient receives $1456 disability.     Dialysis Adherence: Dialysis unit reports 0 AMA's and 0 no-shows over previous three months. Scanned into pt's media.    Coping: Patient denies having any concerns and/or overwhelming feelings regarding transplant currently.  Patient denied needing or wanting mental health resources or referrals at this time. Patient agreed to contact  team as needed.    Patient deny any additional concerns, stating preparedness and motivation to proceed with organ transplant.     Suitability for Transplant:   Pt remains low risk for transplant at this time based on psychosocial risk factors and strengths. Patient has adequate family and caregiver support, good adherence, appropriate coping skills, medical insurance, reliable transportation, being able to financially can afford medications and not using any substances unless prescribed.    Patient is highly motivated to receive kidney transplant. SW recommends patient contact insurance for lodging benefits, conduct fundraising to assist patient with pay for cost of medications, food, gas, and other transplant related needs. SW recommends that patient remain aware of potential mental health concerns and contact the team if any concerns arise. Patient denied needing or wanting mental health resources or referrals at this time. Patient agreed to contact  team as needed. SW recommends that patient remain abstinent from tobacco, ETOH, and drug use. SW supports patient continued adherence. SW remains available to answer any questions or concerns that arise as the patient moves through the transplant process.     Final  determination of transplant candidacy will be reviewed and determined by the selection committee.      Ruth Johnston LCSW, Kidney Transplant

## 2025-06-13 NOTE — PATIENT INSTRUCTIONS
-due for UTD TTE and stress test     Wt 122.2 kg (269 l  BMI 39.78 kg/m²   Encourage lifestyle modifications: diet, exercise, weight loss   Needs to maintain acceptable BMI for txp/guidelines

## 2025-06-13 NOTE — LETTER
June 16, 2025        Ganesh Arambula  [address] [facility] [address]  Phone: [phone]  Fax: [phone]             [facility] 1st Fl  [address]  [facility] [address]  Phone: [phone]   Patient: Cholo Jackson   MR Number: [id number]   YOB: 1995   Date of Visit: 6/13/2025       Dear Dr. Ganesh Arambula    Thank you for referring Cholo Jackson to me for evaluation. Attached you will find relevant portions of my assessment and plan of care.    If you have questions, please do not hesitate to call me. I look forward to following Cholo Jackson along with you.    Sincerely,    Dariana Rosas, NP    Enclosure    If you would like to receive this communication electronically, please contact [email or web address] or [phone] to request [employer] Link access.    [employer] is a tool which provides read-only access to select patient information with whom you have a relationship. Its easy to use and provides real time access to review your patients record including encounter summaries, notes, results, and demographic information.    If you feel you have received this communication in error or would no longer like to receive these types of communications, please e-mail [email or web address]

## 2025-06-13 NOTE — PROGRESS NOTES
AA YEARLY PATIENT EDUCATION NOTE    Mr. Cholo Jackson was seen in pre-kidney transplant clinic for yearly (or six months) evaluation for kidney, kidney/pancreas or pancreas only transplant.  The patient attended a web based education session that discussed/reviewed the following aspects of transplantation: UNOS waitlist management/multiple listings, types of organs offered (KDPI < 85%, KDPI > 85%, PHS increased risk, DCD, HCV+), financial aspects, surgical procedures, dietary instruction pre- and post-transplant, health maintenance pre- and post-transplant, post-transplant hospitalization and outpatient follow-up, potential to participate in a research protocol, and medication management and side effects. A question and answer session was provided after the presentation.    The patient was seen by all members of the multi-disciplinary team to include: Nephrologist/PA, , , Pharmacist and Dietician (if applicable).    The Patient was educated on OPTN policy change regarding race based eGFR. For Black or  Americans, this eGFR could have shown that their kidneys were working better than they were.    Because of this change, we are looking at everyones record and assessing waiting time for people who are eligible. We will be reviewing your medical records and will notify you if you are eligible. We also encouraged patient to provide span 20 labs that are not in our electronic medical records.    The patient reviewed and signed all consents for evaluation which were witnessed and sent to scanning into the [facility] chart.    The patient was given an education book and plan for further evaluation based on his individual assessment.      The patient was encouraged to call with any questions or concerns.